# Patient Record
Sex: MALE | Race: WHITE | NOT HISPANIC OR LATINO | URBAN - METROPOLITAN AREA
[De-identification: names, ages, dates, MRNs, and addresses within clinical notes are randomized per-mention and may not be internally consistent; named-entity substitution may affect disease eponyms.]

---

## 2017-04-29 ENCOUNTER — EMERGENCY (EMERGENCY)
Facility: HOSPITAL | Age: 22
LOS: 1 days | End: 2017-04-29
Payer: COMMERCIAL

## 2017-04-29 PROCEDURE — 99284 EMERGENCY DEPT VISIT MOD MDM: CPT | Mod: 25

## 2017-04-29 PROCEDURE — 74176 CT ABD & PELVIS W/O CONTRAST: CPT | Mod: 26

## 2017-08-05 ENCOUNTER — EMERGENCY (EMERGENCY)
Facility: HOSPITAL | Age: 22
LOS: 1 days | End: 2017-08-05
Payer: COMMERCIAL

## 2017-08-05 PROCEDURE — 70450 CT HEAD/BRAIN W/O DYE: CPT | Mod: 26

## 2017-08-05 PROCEDURE — 99285 EMERGENCY DEPT VISIT HI MDM: CPT | Mod: 25

## 2017-12-01 ENCOUNTER — TRANSCRIPTION ENCOUNTER (OUTPATIENT)
Age: 22
End: 2017-12-01

## 2017-12-01 PROBLEM — Z00.00 ENCOUNTER FOR PREVENTIVE HEALTH EXAMINATION: Status: ACTIVE | Noted: 2017-12-01

## 2017-12-04 ENCOUNTER — EMERGENCY (EMERGENCY)
Facility: HOSPITAL | Age: 22
LOS: 1 days | End: 2017-12-04
Payer: COMMERCIAL

## 2017-12-04 PROCEDURE — 99285 EMERGENCY DEPT VISIT HI MDM: CPT

## 2017-12-04 PROCEDURE — 71010: CPT | Mod: 26

## 2017-12-12 ENCOUNTER — NON-APPOINTMENT (OUTPATIENT)
Age: 22
End: 2017-12-12

## 2017-12-12 ENCOUNTER — APPOINTMENT (OUTPATIENT)
Dept: CARDIOLOGY | Facility: CLINIC | Age: 22
End: 2017-12-12
Payer: COMMERCIAL

## 2017-12-12 VITALS
SYSTOLIC BLOOD PRESSURE: 110 MMHG | HEART RATE: 82 BPM | WEIGHT: 160 LBS | BODY MASS INDEX: 22.4 KG/M2 | HEIGHT: 71 IN | DIASTOLIC BLOOD PRESSURE: 74 MMHG

## 2017-12-12 DIAGNOSIS — R42 DIZZINESS AND GIDDINESS: ICD-10-CM

## 2017-12-12 PROCEDURE — 93000 ELECTROCARDIOGRAM COMPLETE: CPT

## 2017-12-12 PROCEDURE — 99244 OFF/OP CNSLTJ NEW/EST MOD 40: CPT

## 2017-12-15 ENCOUNTER — TRANSCRIPTION ENCOUNTER (OUTPATIENT)
Age: 22
End: 2017-12-15

## 2017-12-21 ENCOUNTER — APPOINTMENT (OUTPATIENT)
Dept: CARDIOLOGY | Facility: CLINIC | Age: 22
End: 2017-12-21
Payer: COMMERCIAL

## 2017-12-21 PROCEDURE — 93306 TTE W/DOPPLER COMPLETE: CPT

## 2017-12-28 ENCOUNTER — APPOINTMENT (OUTPATIENT)
Dept: CARDIOLOGY | Facility: CLINIC | Age: 22
End: 2017-12-28
Payer: COMMERCIAL

## 2017-12-28 PROCEDURE — 93351 STRESS TTE COMPLETE: CPT

## 2017-12-31 ENCOUNTER — TRANSCRIPTION ENCOUNTER (OUTPATIENT)
Age: 22
End: 2017-12-31

## 2018-01-02 ENCOUNTER — TRANSCRIPTION ENCOUNTER (OUTPATIENT)
Age: 23
End: 2018-01-02

## 2018-01-09 ENCOUNTER — APPOINTMENT (OUTPATIENT)
Dept: CARDIOLOGY | Facility: CLINIC | Age: 23
End: 2018-01-09

## 2018-07-26 ENCOUNTER — TRANSCRIPTION ENCOUNTER (OUTPATIENT)
Age: 23
End: 2018-07-26

## 2018-07-27 ENCOUNTER — APPOINTMENT (OUTPATIENT)
Dept: CARDIOLOGY | Facility: CLINIC | Age: 23
End: 2018-07-27
Payer: COMMERCIAL

## 2018-07-27 VITALS
HEART RATE: 60 BPM | DIASTOLIC BLOOD PRESSURE: 70 MMHG | WEIGHT: 160 LBS | BODY MASS INDEX: 22.4 KG/M2 | SYSTOLIC BLOOD PRESSURE: 120 MMHG | HEIGHT: 71 IN

## 2018-07-27 DIAGNOSIS — R00.2 PALPITATIONS: ICD-10-CM

## 2018-07-27 PROCEDURE — 99214 OFFICE O/P EST MOD 30 MIN: CPT

## 2018-07-31 PROCEDURE — 93224 XTRNL ECG REC UP TO 48 HRS: CPT

## 2018-08-02 ENCOUNTER — RESULT REVIEW (OUTPATIENT)
Age: 23
End: 2018-08-02

## 2018-08-03 ENCOUNTER — APPOINTMENT (OUTPATIENT)
Dept: CARDIOLOGY | Facility: CLINIC | Age: 23
End: 2018-08-03
Payer: COMMERCIAL

## 2018-08-03 PROCEDURE — 93308 TTE F-UP OR LMTD: CPT

## 2018-08-09 ENCOUNTER — APPOINTMENT (OUTPATIENT)
Dept: CARDIOLOGY | Facility: CLINIC | Age: 23
End: 2018-08-09
Payer: COMMERCIAL

## 2018-08-09 PROCEDURE — 93351 STRESS TTE COMPLETE: CPT

## 2018-08-15 ENCOUNTER — APPOINTMENT (OUTPATIENT)
Dept: CARDIOLOGY | Facility: CLINIC | Age: 23
End: 2018-08-15
Payer: COMMERCIAL

## 2018-08-15 VITALS
HEIGHT: 71 IN | OXYGEN SATURATION: 100 % | SYSTOLIC BLOOD PRESSURE: 131 MMHG | DIASTOLIC BLOOD PRESSURE: 89 MMHG | WEIGHT: 160 LBS | BODY MASS INDEX: 22.4 KG/M2 | HEART RATE: 63 BPM

## 2018-08-15 DIAGNOSIS — R94.31 ABNORMAL ELECTROCARDIOGRAM [ECG] [EKG]: ICD-10-CM

## 2018-08-15 DIAGNOSIS — R07.9 CHEST PAIN, UNSPECIFIED: ICD-10-CM

## 2018-08-15 PROCEDURE — 99214 OFFICE O/P EST MOD 30 MIN: CPT

## 2019-01-06 ENCOUNTER — EMERGENCY (EMERGENCY)
Facility: HOSPITAL | Age: 24
LOS: 1 days | End: 2019-01-06
Payer: COMMERCIAL

## 2019-01-06 ENCOUNTER — INPATIENT (INPATIENT)
Facility: HOSPITAL | Age: 24
LOS: 0 days | Discharge: HOME | DRG: 566 | End: 2019-01-07
Attending: PLASTIC SURGERY | Admitting: PLASTIC SURGERY
Payer: SELF-PAY

## 2019-01-06 ENCOUNTER — TRANSCRIPTION ENCOUNTER (OUTPATIENT)
Age: 24
End: 2019-01-06

## 2019-01-06 VITALS
OXYGEN SATURATION: 99 % | WEIGHT: 160.06 LBS | TEMPERATURE: 98 F | HEIGHT: 71 IN | HEART RATE: 90 BPM | DIASTOLIC BLOOD PRESSURE: 84 MMHG | SYSTOLIC BLOOD PRESSURE: 145 MMHG | RESPIRATION RATE: 18 BRPM

## 2019-01-06 LAB
APTT BLD: 33.8 SEC — SIGNIFICANT CHANGE UP (ref 27.5–36.3)
INR BLD: 1.09 RATIO — SIGNIFICANT CHANGE UP (ref 0.88–1.16)
PROTHROM AB SERPL-ACNC: 12.6 SEC — SIGNIFICANT CHANGE UP (ref 10–12.9)

## 2019-01-06 PROCEDURE — 73140 X-RAY EXAM OF FINGER(S): CPT | Mod: 26,RT

## 2019-01-06 PROCEDURE — 99283 EMERGENCY DEPT VISIT LOW MDM: CPT

## 2019-01-06 NOTE — ED PROVIDER NOTE - PHYSICAL EXAMINATION
Const: Awake, alert and oriented. In no acute distress. Well appearing.  HEENT: NC/AT. Moist mucous membranes.  Eyes: Conjunctiva pink, sclera white bilaterally EOMI.  Neck:. Soft and supple. Full ROM without pain.  Cardiac: Regular rate and regular rhythm. +S1/S2. No murmurs. Peripheral pulses 2+ and symmetric. No LE edema.  Resp: Speaking in full sentences. No evidence of respiratory distress. No wheezes, rales or rhonchi.  Abd: Soft, non-tender, non-distended. Normal bowel sounds in all 4 quadrants. No guarding or rebound.  Back: Spine midline and non-tender. No CVAT.  MSK: Decreased ROM in right 5th digit, Extension of right 5th digit absent, slight flexion noted, capillary refill <2, neurovascularly intact, radial pulse 2+, tendon visualized laceration   Skin: Deep circumferential laceration at the lateral right 5th PIP, no signs of necrosis or lack of blood supply  Lymph: No cervical lymphadenopathy.  Neuro: Awake, alert & oriented x 3. Moves all extremities symmetrically.

## 2019-01-06 NOTE — ED ADULT TRIAGE NOTE - CHIEF COMPLAINT QUOTE
I cut my rt 5th digit I was seen at [place] I didn't want a pa to suture it.    I was told to go to [facility] to follow up with hand surgeon dr elise is aware. he told me to come for sx in am

## 2019-01-06 NOTE — ED PROVIDER NOTE - CARE PLAN
Principal Discharge DX:	Laceration of finger Principal Discharge DX:	Laceration of finger of left hand with tendon involvement
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
## 2019-01-07 VITALS
RESPIRATION RATE: 12 BRPM | DIASTOLIC BLOOD PRESSURE: 81 MMHG | HEART RATE: 84 BPM | SYSTOLIC BLOOD PRESSURE: 118 MMHG | OXYGEN SATURATION: 100 %

## 2019-01-07 DIAGNOSIS — S61.219A LACERATION WITHOUT FOREIGN BODY OF UNSPECIFIED FINGER WITHOUT DAMAGE TO NAIL, INITIAL ENCOUNTER: ICD-10-CM

## 2019-01-07 LAB
ABO RH CONFIRMATION: SIGNIFICANT CHANGE UP
ALBUMIN SERPL ELPH-MCNC: 4.7 G/DL — SIGNIFICANT CHANGE UP (ref 3.3–5.2)
ALP SERPL-CCNC: 65 U/L — SIGNIFICANT CHANGE UP (ref 40–120)
ALT FLD-CCNC: 16 U/L — SIGNIFICANT CHANGE UP
ANION GAP SERPL CALC-SCNC: 15 MMOL/L — SIGNIFICANT CHANGE UP (ref 5–17)
AST SERPL-CCNC: 18 U/L — SIGNIFICANT CHANGE UP
BASOPHILS # BLD AUTO: 0 K/UL — SIGNIFICANT CHANGE UP (ref 0–0.2)
BASOPHILS NFR BLD AUTO: 0.2 % — SIGNIFICANT CHANGE UP (ref 0–2)
BILIRUB SERPL-MCNC: 0.7 MG/DL — SIGNIFICANT CHANGE UP (ref 0.4–2)
BLD GP AB SCN SERPL QL: SIGNIFICANT CHANGE UP
BUN SERPL-MCNC: 12 MG/DL — SIGNIFICANT CHANGE UP (ref 8–20)
CALCIUM SERPL-MCNC: 9.2 MG/DL — SIGNIFICANT CHANGE UP (ref 8.6–10.2)
CHLORIDE SERPL-SCNC: 101 MMOL/L — SIGNIFICANT CHANGE UP (ref 98–107)
CO2 SERPL-SCNC: 26 MMOL/L — SIGNIFICANT CHANGE UP (ref 22–29)
CREAT SERPL-MCNC: 0.92 MG/DL — SIGNIFICANT CHANGE UP (ref 0.5–1.3)
EOSINOPHIL # BLD AUTO: 0 K/UL — SIGNIFICANT CHANGE UP (ref 0–0.5)
EOSINOPHIL NFR BLD AUTO: 0.1 % — SIGNIFICANT CHANGE UP (ref 0–5)
GLUCOSE SERPL-MCNC: 105 MG/DL — SIGNIFICANT CHANGE UP (ref 70–115)
HCT VFR BLD CALC: 45.2 % — SIGNIFICANT CHANGE UP (ref 42–52)
HGB BLD-MCNC: 16.5 G/DL — SIGNIFICANT CHANGE UP (ref 14–18)
LYMPHOCYTES # BLD AUTO: 1.4 K/UL — SIGNIFICANT CHANGE UP (ref 1–4.8)
LYMPHOCYTES # BLD AUTO: 11.8 % — LOW (ref 20–55)
MCHC RBC-ENTMCNC: 31.4 PG — HIGH (ref 27–31)
MCHC RBC-ENTMCNC: 36.5 G/DL — HIGH (ref 32–36)
MCV RBC AUTO: 85.9 FL — SIGNIFICANT CHANGE UP (ref 80–94)
MONOCYTES # BLD AUTO: 0.6 K/UL — SIGNIFICANT CHANGE UP (ref 0–0.8)
MONOCYTES NFR BLD AUTO: 4.9 % — SIGNIFICANT CHANGE UP (ref 3–10)
NEUTROPHILS # BLD AUTO: 9.9 K/UL — HIGH (ref 1.8–8)
NEUTROPHILS NFR BLD AUTO: 82.7 % — HIGH (ref 37–73)
PLATELET # BLD AUTO: 231 K/UL — SIGNIFICANT CHANGE UP (ref 150–400)
POTASSIUM SERPL-MCNC: 4 MMOL/L — SIGNIFICANT CHANGE UP (ref 3.5–5.3)
POTASSIUM SERPL-SCNC: 4 MMOL/L — SIGNIFICANT CHANGE UP (ref 3.5–5.3)
PROT SERPL-MCNC: 7.1 G/DL — SIGNIFICANT CHANGE UP (ref 6.6–8.7)
RBC # BLD: 5.26 M/UL — SIGNIFICANT CHANGE UP (ref 4.6–6.2)
RBC # FLD: 12 % — SIGNIFICANT CHANGE UP (ref 11–15.6)
SODIUM SERPL-SCNC: 142 MMOL/L — SIGNIFICANT CHANGE UP (ref 135–145)
TYPE + AB SCN PNL BLD: SIGNIFICANT CHANGE UP
WBC # BLD: 11.9 K/UL — HIGH (ref 4.8–10.8)
WBC # FLD AUTO: 11.9 K/UL — HIGH (ref 4.8–10.8)

## 2019-01-07 PROCEDURE — 80053 COMPREHEN METABOLIC PANEL: CPT

## 2019-01-07 PROCEDURE — 93010 ELECTROCARDIOGRAM REPORT: CPT

## 2019-01-07 PROCEDURE — 26540 REPAIR HAND JOINT: CPT | Mod: AS

## 2019-01-07 PROCEDURE — 73130 X-RAY EXAM OF HAND: CPT

## 2019-01-07 PROCEDURE — C1713: CPT

## 2019-01-07 PROCEDURE — 86901 BLOOD TYPING SEROLOGIC RH(D): CPT

## 2019-01-07 PROCEDURE — 85027 COMPLETE CBC AUTOMATED: CPT

## 2019-01-07 PROCEDURE — 36415 COLL VENOUS BLD VENIPUNCTURE: CPT

## 2019-01-07 PROCEDURE — 73130 X-RAY EXAM OF HAND: CPT | Mod: 26,RT

## 2019-01-07 PROCEDURE — 73140 X-RAY EXAM OF FINGER(S): CPT | Mod: 26,RT

## 2019-01-07 PROCEDURE — 86900 BLOOD TYPING SEROLOGIC ABO: CPT

## 2019-01-07 PROCEDURE — 73140 X-RAY EXAM OF FINGER(S): CPT

## 2019-01-07 PROCEDURE — 71046 X-RAY EXAM CHEST 2 VIEWS: CPT | Mod: 26

## 2019-01-07 PROCEDURE — 99285 EMERGENCY DEPT VISIT HI MDM: CPT | Mod: 25

## 2019-01-07 PROCEDURE — 93005 ELECTROCARDIOGRAM TRACING: CPT

## 2019-01-07 PROCEDURE — 12001 RPR S/N/AX/GEN/TRNK 2.5CM/<: CPT | Mod: F9

## 2019-01-07 PROCEDURE — 85610 PROTHROMBIN TIME: CPT

## 2019-01-07 PROCEDURE — 85730 THROMBOPLASTIN TIME PARTIAL: CPT

## 2019-01-07 PROCEDURE — 71046 X-RAY EXAM CHEST 2 VIEWS: CPT

## 2019-01-07 PROCEDURE — 86850 RBC ANTIBODY SCREEN: CPT

## 2019-01-07 RX ORDER — ACETAMINOPHEN 500 MG
975 TABLET ORAL EVERY 8 HOURS
Qty: 0 | Refills: 0 | Status: DISCONTINUED | OUTPATIENT
Start: 2019-01-07 | End: 2019-01-07

## 2019-01-07 RX ORDER — OXYCODONE HYDROCHLORIDE 5 MG/1
10 TABLET ORAL
Qty: 0 | Refills: 0 | Status: DISCONTINUED | OUTPATIENT
Start: 2019-01-07 | End: 2019-01-07

## 2019-01-07 RX ORDER — ACETAMINOPHEN 500 MG
1000 TABLET ORAL ONCE
Qty: 0 | Refills: 0 | Status: DISCONTINUED | OUTPATIENT
Start: 2019-01-07 | End: 2019-01-07

## 2019-01-07 RX ORDER — OXYCODONE HYDROCHLORIDE 5 MG/1
5 TABLET ORAL
Qty: 0 | Refills: 0 | Status: DISCONTINUED | OUTPATIENT
Start: 2019-01-07 | End: 2019-01-07

## 2019-01-07 RX ORDER — SODIUM CHLORIDE 9 MG/ML
1000 INJECTION, SOLUTION INTRAVENOUS
Qty: 0 | Refills: 0 | Status: DISCONTINUED | OUTPATIENT
Start: 2019-01-07 | End: 2019-01-07

## 2019-01-07 RX ORDER — MORPHINE SULFATE 50 MG/1
4 CAPSULE, EXTENDED RELEASE ORAL EVERY 6 HOURS
Qty: 0 | Refills: 0 | Status: DISCONTINUED | OUTPATIENT
Start: 2019-01-07 | End: 2019-01-07

## 2019-01-07 RX ORDER — OXYCODONE HYDROCHLORIDE 5 MG/1
1 TABLET ORAL
Qty: 18 | Refills: 0 | OUTPATIENT
Start: 2019-01-07 | End: 2019-01-09

## 2019-01-07 RX ORDER — HYDROMORPHONE HYDROCHLORIDE 2 MG/ML
0.5 INJECTION INTRAMUSCULAR; INTRAVENOUS; SUBCUTANEOUS
Qty: 0 | Refills: 0 | Status: DISCONTINUED | OUTPATIENT
Start: 2019-01-07 | End: 2019-01-07

## 2019-01-07 RX ORDER — CEPHALEXIN 500 MG
1 CAPSULE ORAL
Qty: 12 | Refills: 0 | OUTPATIENT
Start: 2019-01-07 | End: 2019-01-09

## 2019-01-07 RX ORDER — CEFAZOLIN SODIUM 1 G
2000 VIAL (EA) INJECTION EVERY 8 HOURS
Qty: 0 | Refills: 0 | Status: DISCONTINUED | OUTPATIENT
Start: 2019-01-07 | End: 2019-01-07

## 2019-01-07 RX ORDER — OXYCODONE HYDROCHLORIDE 5 MG/1
10 TABLET ORAL EVERY 4 HOURS
Qty: 0 | Refills: 0 | Status: DISCONTINUED | OUTPATIENT
Start: 2019-01-07 | End: 2019-01-07

## 2019-01-07 RX ORDER — CEPHALEXIN 500 MG
1 CAPSULE ORAL
Qty: 6 | Refills: 0 | OUTPATIENT
Start: 2019-01-07 | End: 2019-01-09

## 2019-01-07 RX ORDER — CEPHALEXIN 500 MG
1 CAPSULE ORAL
Qty: 40 | Refills: 0 | OUTPATIENT
Start: 2019-01-07 | End: 2019-01-16

## 2019-01-07 RX ORDER — OXYCODONE HYDROCHLORIDE 5 MG/1
1 TABLET ORAL
Qty: 30 | Refills: 0 | OUTPATIENT
Start: 2019-01-07 | End: 2019-01-11

## 2019-01-07 RX ORDER — ONDANSETRON 8 MG/1
4 TABLET, FILM COATED ORAL ONCE
Qty: 0 | Refills: 0 | Status: DISCONTINUED | OUTPATIENT
Start: 2019-01-07 | End: 2019-01-07

## 2019-01-07 RX ORDER — OXYCODONE HYDROCHLORIDE 5 MG/1
5 TABLET ORAL EVERY 4 HOURS
Qty: 0 | Refills: 0 | Status: DISCONTINUED | OUTPATIENT
Start: 2019-01-07 | End: 2019-01-07

## 2019-01-07 RX ADMIN — Medication 975 MILLIGRAM(S): at 06:15

## 2019-01-07 RX ADMIN — Medication 100 MILLIGRAM(S): at 05:24

## 2019-01-07 RX ADMIN — SODIUM CHLORIDE 80 MILLILITER(S): 9 INJECTION, SOLUTION INTRAVENOUS at 02:15

## 2019-01-07 RX ADMIN — Medication 975 MILLIGRAM(S): at 05:25

## 2019-01-07 NOTE — ASU DISCHARGE PLAN (ADULT/PEDIATRIC). - NOTIFY
Swelling that continues/Pain not relieved by Medications/Fever greater than 101/Numbness, tingling/Bleeding that does not stop/Numbness, color, or temperature change to extremity

## 2019-01-07 NOTE — ED ADULT NURSE NOTE - CHPI ED NUR SYMPTOMS NEG
no bruising/no abrasion/no tingling/no stiffness/no back pain/no weakness/no difficulty bearing weight/no fever

## 2019-01-07 NOTE — ED ADULT NURSE NOTE - CHIEF COMPLAINT QUOTE
I cut my rt 5th digit I was seen at [place] I didn't want a pa to suture it.    I was told to go to [facility] to follow up with hand surgeon dr elise is aware. he told me to come for sx in am
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
## 2019-01-13 ENCOUNTER — TRANSCRIPTION ENCOUNTER (OUTPATIENT)
Age: 24
End: 2019-01-13

## 2019-03-01 ENCOUNTER — TRANSCRIPTION ENCOUNTER (OUTPATIENT)
Age: 24
End: 2019-03-01

## 2019-09-03 NOTE — ED ADULT NURSE REASSESSMENT NOTE - NS ED NURSE REASSESS COMMENT FT1
patient in no distress family at bedside, awaiting call back from floor, VSS, offered pain medication patient declined, no other complaints offered Yes

## 2022-09-20 ENCOUNTER — NON-APPOINTMENT (OUTPATIENT)
Age: 27
End: 2022-09-20

## 2022-09-20 ENCOUNTER — APPOINTMENT (OUTPATIENT)
Dept: OPHTHALMOLOGY | Facility: CLINIC | Age: 27
End: 2022-09-20

## 2022-09-20 PROCEDURE — 92004 COMPRE OPH EXAM NEW PT 1/>: CPT

## 2022-09-28 ENCOUNTER — APPOINTMENT (OUTPATIENT)
Dept: OPHTHALMOLOGY | Facility: CLINIC | Age: 27
End: 2022-09-28

## 2022-09-28 ENCOUNTER — NON-APPOINTMENT (OUTPATIENT)
Age: 27
End: 2022-09-28

## 2022-09-28 PROCEDURE — 92012 INTRM OPH EXAM EST PATIENT: CPT

## 2023-01-25 ENCOUNTER — NON-APPOINTMENT (OUTPATIENT)
Age: 28
End: 2023-01-25

## 2023-01-25 ENCOUNTER — APPOINTMENT (OUTPATIENT)
Dept: OPHTHALMOLOGY | Facility: CLINIC | Age: 28
End: 2023-01-25
Payer: MEDICAID

## 2023-01-25 PROCEDURE — 92014 COMPRE OPH EXAM EST PT 1/>: CPT

## 2023-02-08 ENCOUNTER — NON-APPOINTMENT (OUTPATIENT)
Age: 28
End: 2023-02-08

## 2023-02-08 ENCOUNTER — APPOINTMENT (OUTPATIENT)
Dept: OPHTHALMOLOGY | Facility: CLINIC | Age: 28
End: 2023-02-08
Payer: MEDICAID

## 2023-02-08 PROCEDURE — 99213 OFFICE O/P EST LOW 20 MIN: CPT

## 2023-03-16 ENCOUNTER — APPOINTMENT (OUTPATIENT)
Dept: OPHTHALMOLOGY | Facility: CLINIC | Age: 28
End: 2023-03-16

## 2024-04-29 ENCOUNTER — APPOINTMENT (OUTPATIENT)
Dept: ORTHOPEDIC SURGERY | Facility: CLINIC | Age: 29
End: 2024-04-29
Payer: COMMERCIAL

## 2024-04-29 DIAGNOSIS — S43.51XA SPRAIN OF RIGHT ACROMIOCLAVICULAR JOINT, INITIAL ENCOUNTER: ICD-10-CM

## 2024-04-29 PROCEDURE — 73030 X-RAY EXAM OF SHOULDER: CPT | Mod: RT

## 2024-04-29 PROCEDURE — 99204 OFFICE O/P NEW MOD 45 MIN: CPT

## 2024-04-29 NOTE — HISTORY OF PRESENT ILLNESS
[de-identified] : Date of evaluation 04/29/2024  Patient age in years is 28 Occupation is xray tech Body part causing symptoms is the right shoulder Symptoms began 04/15/2024, felt pain while bench pressing  Location of pain is superior Quality of pain is dull Pain score at rest is 2/10 Pain score during activity is 5/10 Radicular symptoms are not present Prior treatments include rest Patient's condition is not associated with workers compensation, no-fault or interscholastic athletics

## 2024-04-29 NOTE — IMAGING
[de-identified] : (Exam: Shoulder)   Laterality is right    Patient is in no acute distress, alert and oriented Sensation is grossly intact to light touch in the hand Motor function is 5/5 in the hand Capillary refill is less than 2 seconds in the fingers Lymphadenopathy is not present Peripheral edema is not present   Skin is intact Swelling is not present Atrophy is not present Scapular winging is not present Deformity of the AC joint is not present Deformity of the biceps is not present   Bicipital groove tenderness is present AC joint tenderness is present Scapulothoracic tenderness is not present   Active forward elevation is 160 Passive forward elevation is 175 External rotation at the side is 60 Internal rotation behind the back is to the level of T12   Forward elevation strength is 5-/5 External rotation strength at the side is 5/5 Internal rotation strength at the side is 5/5 Deltoid strength of anterior, posterior and lateral heads is 5/5   Redd test is abnormal OBriens test is abnormal Empty can test is abnormal Speeds test is abnormal Cross body adduction test is abnormal Belly press test is normal Apprehension and relocation is normal Sulcus sign is normal    [Right] : right shoulder [There are no fractures, subluxations or dislocations. No significant abnormalities are seen] : There are no fractures, subluxations or dislocations. No significant abnormalities are seen

## 2024-04-29 NOTE — DISCUSSION/SUMMARY
[de-identified] : History, clinical examination and imaging were most consistent with: -right shoulder grade 1 AC joint sprain, possible SLAP injury   The diagnosis was explained in detail. The potential non-surgical and surgical treatments were reviewed. The relative risks and benefits of each option were considered relative to the patients age, activity level, medical history, symptom severity and previously attempted treatments.   The patient was advised to consult with their primary medical provider prior to initiation of any new medications to reduce the risk of adverse effects specific to their long-term home medications and medical history. The risk of gastrointestinal irritation and kidney injury specific to long-term NSAID use was discussed.   -Patient will proceed with formal PT. -Meloxicam as needed for pain. -The added clinical utility of an MRI was discussed. The patient deferred further diagnostic testing at this time. -Follow up in 6 weeks. If symptoms persist consider MRI.    ([facility])   Problem Complexity -Moderate: acute, complicated injury   Risk -Moderate: prescription medication   -Patient has not been seen by another provider in my practice within the past 2 years who specializes in orthopedic surgery.

## 2024-06-10 ENCOUNTER — APPOINTMENT (OUTPATIENT)
Dept: ORTHOPEDIC SURGERY | Facility: CLINIC | Age: 29
End: 2024-06-10

## 2024-10-07 ENCOUNTER — APPOINTMENT (OUTPATIENT)
Dept: ORTHOPEDIC SURGERY | Facility: CLINIC | Age: 29
End: 2024-10-07
Payer: COMMERCIAL

## 2024-10-07 VITALS — WEIGHT: 170 LBS | BODY MASS INDEX: 23.8 KG/M2 | HEIGHT: 71 IN

## 2024-10-07 DIAGNOSIS — S63.509A UNSPECIFIED SPRAIN OF UNSPECIFIED WRIST, INITIAL ENCOUNTER: ICD-10-CM

## 2024-10-07 DIAGNOSIS — Z78.9 OTHER SPECIFIED HEALTH STATUS: ICD-10-CM

## 2024-10-07 PROCEDURE — 99204 OFFICE O/P NEW MOD 45 MIN: CPT

## 2024-10-07 PROCEDURE — 73130 X-RAY EXAM OF HAND: CPT | Mod: RT

## 2024-10-08 PROBLEM — S63.509A WRIST SPRAIN: Status: ACTIVE | Noted: 2024-10-07

## 2024-11-18 ENCOUNTER — APPOINTMENT (OUTPATIENT)
Dept: ORTHOPEDIC SURGERY | Facility: CLINIC | Age: 29
End: 2024-11-18

## 2025-01-29 ENCOUNTER — INPATIENT (INPATIENT)
Facility: HOSPITAL | Age: 30
LOS: 12 days | Discharge: HOME | DRG: 885 | End: 2025-02-11
Attending: PSYCHIATRY & NEUROLOGY | Admitting: PSYCHIATRY & NEUROLOGY
Payer: COMMERCIAL

## 2025-01-29 VITALS — RESPIRATION RATE: 18 BRPM | HEIGHT: 70 IN | OXYGEN SATURATION: 95 % | WEIGHT: 154.1 LBS | TEMPERATURE: 99 F

## 2025-01-29 DIAGNOSIS — F33.3 MAJOR DEPRESSIVE DISORDER, RECURRENT, SEVERE WITH PSYCHOTIC SYMPTOMS: ICD-10-CM

## 2025-01-29 PROCEDURE — 80061 LIPID PANEL: CPT

## 2025-01-29 PROCEDURE — 80053 COMPREHEN METABOLIC PANEL: CPT

## 2025-01-29 PROCEDURE — 86769 SARS-COV-2 COVID-19 ANTIBODY: CPT

## 2025-01-29 PROCEDURE — 84443 ASSAY THYROID STIM HORMONE: CPT

## 2025-01-29 PROCEDURE — 83036 HEMOGLOBIN GLYCOSYLATED A1C: CPT

## 2025-01-29 PROCEDURE — 85025 COMPLETE CBC W/AUTO DIFF WBC: CPT

## 2025-01-29 PROCEDURE — 99223 1ST HOSP IP/OBS HIGH 75: CPT

## 2025-01-29 PROCEDURE — 36415 COLL VENOUS BLD VENIPUNCTURE: CPT

## 2025-01-29 PROCEDURE — 99232 SBSQ HOSP IP/OBS MODERATE 35: CPT

## 2025-01-29 RX ORDER — IRON/FOLIC ACID/C/B6/B12/ZINC 150-1.25MG
1 TABLET ORAL DAILY
Refills: 0 | Status: DISCONTINUED | OUTPATIENT
Start: 2025-01-29 | End: 2025-02-11

## 2025-01-29 RX ORDER — ESCITALOPRAM 10 MG/1
5 TABLET, FILM COATED ORAL DAILY
Refills: 0 | Status: DISCONTINUED | OUTPATIENT
Start: 2025-01-30 | End: 2025-01-31

## 2025-01-29 RX ORDER — THIAMINE HCL 100 MG
100 TABLET ORAL DAILY
Refills: 0 | Status: DISCONTINUED | OUTPATIENT
Start: 2025-01-29 | End: 2025-02-11

## 2025-01-29 RX ORDER — MAGNESIUM, ALUMINUM HYDROXIDE 200-225/5
30 SUSPENSION, ORAL (FINAL DOSE FORM) ORAL EVERY 6 HOURS
Refills: 0 | Status: DISCONTINUED | OUTPATIENT
Start: 2025-01-29 | End: 2025-02-11

## 2025-01-29 RX ORDER — OSELTAMIVIR PHOSPHATE 75 MG/1
75 CAPSULE ORAL EVERY 24 HOURS
Refills: 0 | Status: COMPLETED | OUTPATIENT
Start: 2025-01-29 | End: 2025-02-07

## 2025-01-29 RX ORDER — MAGNESIUM HYDROXIDE 400 MG/5ML
30 SUSPENSION, ORAL (FINAL DOSE FORM) ORAL DAILY
Refills: 0 | Status: DISCONTINUED | OUTPATIENT
Start: 2025-01-29 | End: 2025-02-11

## 2025-01-29 RX ORDER — ACETAMINOPHEN 160 MG/5ML
650 SUSPENSION ORAL EVERY 6 HOURS
Refills: 0 | Status: DISCONTINUED | OUTPATIENT
Start: 2025-01-29 | End: 2025-02-11

## 2025-01-29 RX ORDER — ACETAMINOPHEN, DIPHENHYDRAMINE HCL, PHENYLEPHRINE HCL 325; 25; 5 MG/1; MG/1; MG/1
5 TABLET ORAL AT BEDTIME
Refills: 0 | Status: DISCONTINUED | OUTPATIENT
Start: 2025-01-29 | End: 2025-02-11

## 2025-01-29 RX ORDER — OLANZAPINE 10 MG/1
5 TABLET, FILM COATED ORAL AT BEDTIME
Refills: 0 | Status: DISCONTINUED | OUTPATIENT
Start: 2025-01-29 | End: 2025-01-31

## 2025-01-29 RX ADMIN — ACETAMINOPHEN, DIPHENHYDRAMINE HCL, PHENYLEPHRINE HCL 5 MILLIGRAM(S): 325; 25; 5 TABLET ORAL at 21:39

## 2025-01-29 RX ADMIN — OLANZAPINE 5 MILLIGRAM(S): 10 TABLET, FILM COATED ORAL at 20:52

## 2025-01-29 RX ADMIN — OSELTAMIVIR PHOSPHATE 75 MILLIGRAM(S): 75 CAPSULE ORAL at 20:52
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
## 2025-01-29 NOTE — H&P ADULT - SKIN
+ linear surgical wound to the umbilicus with 5 staples and linear surgical wound to the epigastric area with 4 staples./warm and dry/color normal/normal/no rashes/no ulcers/wound

## 2025-01-29 NOTE — BH PATIENT PROFILE - NSVRISKUNREALISTIC_PSY_ALL_CORE
Subjective:   Lauren Bowden is a 71 y.o. female here today for evaluation and management of:     Chronic respiratory failure with hypoxia (HCC)  Underlying copd  Continues on oxygen supplementation during the day and night 2L via nasal cannula.     Stage 3 chronic kidney disease (HCC)  Stable,   Repeat labs ordered  Encouraged to stay well hydrated 64oz daily  HTN controlled  Stopped smoking about 5 years ago  Encouraged to avoid NSAIDS and salty foods.          Current medicines (including changes today)  Current Outpatient Medications   Medication Sig Dispense Refill   • buPROPion SR (WELLBUTRIN-SR) 150 MG TABLET SR 12 HR sustained-release tablet TAKE 1 TABLET BY MOUTH  EVERY DAY 90 Tab 0   • furosemide (LASIX) 20 MG Tab TAKE 1 TABLET BY MOUTH TWO  TIMES DAILY 180 Tab 0   • cyclobenzaprine (FLEXERIL) 10 MG Tab TAKE 1 TABLET BY MOUTH 3  TIMES DAILY AS NEEDED FOR  MILD PAIN. 90 Tab 0   • rosuvastatin (CRESTOR) 10 MG Tab TAKE 1 TABLET BY MOUTH  EVERY EVENING 90 Tab 0   • felodipine (PLENDIL) 5 MG TABLET SR 24 HR TAKE 2 TABLETS BY MOUTH  EVERY  Tab 0   • raloxifene (EVISTA) 60 MG Tab TAKE 1 TABLET BY MOUTH  EVERY DAY 90 Tab 0   • ipratropium-albuterol (COMBIVENT RESPIMAT)  MCG/ACT Aero Soln Inhale 1 Puff by mouth 4 times a day. 3 Inhaler 1   • Misc. Devices Misc Portable oxygen concentrator: use daily. 1 Each 0   • clopidogrel (PLAVIX) 75 MG Tab Take 1 Tab by mouth every day. 90 Tab 1   • omeprazole (PRILOSEC) 20 MG delayed-release capsule Take 1 Cap by mouth every day. 90 Cap 1   • calcitRIOL (ROCALTROL) 0.25 MCG Cap TAKE 1 CAPSULE BY MOUTH  EVERY 48 HOURS 45 Cap 3   • albuterol (PROVENTIL) 2.5mg/3ml Nebu Soln solution for nebulization 3 mL by Nebulization route every four hours as needed for Shortness of Breath. 75 mL 11     No current facility-administered medications for this visit.      She  has a past medical history of Arthritis, Asthma, Cataract, CKD (chronic kidney disease) stage 4, GFR  "15-29 ml/min (HCC) (7/1/2013), COPD, Dental disorder, Heart burn, History of anemia, Hypercholesteremia, Hypertension, Indigestion, Ovarian neoplasm (8/10/2017), Pneumonia (? 2012), Pulmonary emphysema (HCC), Stroke (HCC) (2016), and Vitamin d deficiency (7/1/2013).    ROS  No chest pain, no shortness of breath, no abdominal pain       Objective:     /84   Pulse 74   Temp 36.3 °C (97.3 °F) (Temporal)   Resp 16   Ht 1.651 m (5' 5\")   Wt 83.5 kg (184 lb)   SpO2 91%  Body mass index is 30.62 kg/m².   Physical Exam:  Constitutional: Alert, no distress.  Skin: Warm, dry, good turgor, no rashes in visible areas.  Eye: Equal, round and reactive, conjunctiva clear, lids normal.  ENMT: Lips without lesions, good dentition, oropharynx clear.  Neck: Trachea midline, no masses, no thyromegaly. No cervical or supraclavicular lymphadenopathy  Respiratory: Unlabored respiratory effort, lungs clear to auscultation, no wheezes, no ronchi.  Cardiovascular: Normal S1, S2, no murmur, no edema.  Abdomen: Soft, non-tender, no masses, no hepatosplenomegaly.  Psych: Alert and oriented x3, normal affect and mood.        Assessment and Plan:   The following treatment plan was discussed    1. Stage 3 chronic kidney disease (HCC)  - Comp Metabolic Panel; Future  - Lipid Profile; Future  - VITAMIN D,25 HYDROXY; Future  Continue vit d supplement daily.   - PTH INTACT (PTH ONLY); Future  - URINALYSIS; Future  - CBC WITH DIFFERENTIAL; Future  - MICROALBUMIN CREAT RATIO URINE; Future  - REFERRAL TO NEPHROLOGY    2. Essential hypertension  Controlled.   - Comp Metabolic Panel; Future    3. Dyslipidemia  Continue rosuvastatin 10 mg  Recheck labs.   - Lipid Profile; Future    4. Breast cancer screening  - MA-SCREENING MAMMO BILAT W/CAD; Future    5. Colon cancer screening  - REFERRAL TO GI FOR COLONOSCOPY    6. Encounter for hepatitis C screening test for low risk patient  - HEP C VIRUS ANTIBODY; Future    7. Chronic respiratory failure " with hypoxia (HCC)  Stable. Continues oxygen supplementation 2L    8. Obesity (BMI 30-39.9)  - Patient identified as having weight management issue.  Appropriate orders and counseling given.      Followup: Return in about 6 months (around 9/4/2020) for CKD, wt, copd.          No

## 2025-01-29 NOTE — BH PATIENT PROFILE - HOME MEDICATIONS
oxyCODONE 5 mg oral tablet , 1 tab(s) orally every 4 hours x 5 days MDD:5 tabs  cephalexin 500 mg oral capsule , 1 cap(s) orally 4 times a day
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
## 2025-01-29 NOTE — BH INPATIENT PSYCHIATRY ASSESSMENT NOTE - NSBHCHARTREVIEWVS_PSY_A_CORE FT
Vital Signs Last 24 Hrs  T(C): 37 (01-29-25 @ 13:16), Max: 37 (01-29-25 @ 13:16)  T(F): 98.6 (01-29-25 @ 13:16), Max: 98.6 (01-29-25 @ 13:16)  HR: --  BP: --  BP(mean): --  RR: 18 (01-29-25 @ 13:16) (18 - 18)  SpO2: 95% (01-29-25 @ 13:16) (95% - 95%)    Orthostatic VS  01-29-25 @ 13:16  Lying BP: --/-- HR: --  Sitting BP: 118/90 HR: 111  Standing BP: 113/78 HR: 136  Site: --  Mode: --
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
## 2025-01-29 NOTE — BH PATIENT PROFILE - FUNCTIONAL ASSESSMENT - BASIC MOBILITY 6.
4-calculated by average/Not able to assess (calculate score using [facility] averaging method)

## 2025-01-29 NOTE — BH INPATIENT PSYCHIATRY ASSESSMENT NOTE - DESCRIPTION
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

## 2025-01-29 NOTE — BH INPATIENT PSYCHIATRY ASSESSMENT NOTE - NSBHMETABOLIC_PSY_ALL_CORE_FT
BMI: BMI (kg/m2): 22.1 (01-29-25 @ 13:16)  HbA1c:   Glucose:   BP: --Vital Signs Last 24 Hrs  T(C): 37 (01-29-25 @ 13:16), Max: 37 (01-29-25 @ 13:16)  T(F): 98.6 (01-29-25 @ 13:16), Max: 98.6 (01-29-25 @ 13:16)  HR: --  BP: --  BP(mean): --  RR: 18 (01-29-25 @ 13:16) (18 - 18)  SpO2: 95% (01-29-25 @ 13:16) (95% - 95%)    Orthostatic VS  01-29-25 @ 13:16  Lying BP: --/-- HR: --  Sitting BP: 118/90 HR: 111  Standing BP: 113/78 HR: 136  Site: --  Mode: --    Lipid Panel:
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
## 2025-01-29 NOTE — H&P ADULT - NEGATIVE PSYCHIATRIC SYMPTOMS
no insomnia/no memory loss/no paranoia/no mood swings/no agitation/no visual hallucinations/no auditory hallucinations
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
## 2025-01-29 NOTE — BH INPATIENT PSYCHIATRY ASSESSMENT NOTE - VIOLENCE RISK FACTORS:
Feeling of being under threat and being unable to control threat/Substance abuse/Affective dysregulation/Impulsivity/Lack of insight into violence risk/need for treatment/Firearm/weapon access

## 2025-01-29 NOTE — BH INPATIENT PSYCHIATRY ASSESSMENT NOTE - CURRENT MEDICATION
MEDICATIONS  (STANDING):  influenza   Vaccine 0.5 milliLiter(s) IntraMuscular once  melatonin 5 milliGRAM(s) Oral at bedtime  multivitamin/minerals 1 Tablet(s) Oral daily  thiamine 100 milliGRAM(s) Oral daily    MEDICATIONS  (PRN):  acetaminophen     Tablet .. 650 milliGRAM(s) Oral every 6 hours PRN Temp greater or equal to 38C (100.4F), Mild Pain (1 - 3), Moderate Pain (4 - 6)  aluminum hydroxide/magnesium hydroxide/simethicone Suspension 30 milliLiter(s) Oral every 6 hours PRN Dyspepsia  LORazepam     Tablet 1 milliGRAM(s) Oral every 6 hours PRN mild to moderate anxiety/agitation due to mood d/o  magnesium hydroxide Suspension 30 milliLiter(s) Oral daily PRN Constipation

## 2025-01-29 NOTE — BH INPATIENT PSYCHIATRY ASSESSMENT NOTE - NSBHSAALC_PSY_A_CORE FT
Patient reports to drinking socially in his late teens and early 20's. Patient reports his last drink on the day of his SA. Also reports drinking has increased as of late (3-4 times per week).

## 2025-01-30 LAB
A1C WITH ESTIMATED AVERAGE GLUCOSE RESULT: 4.9 % — SIGNIFICANT CHANGE UP (ref 4–5.6)
ALBUMIN SERPL ELPH-MCNC: 3.6 G/DL — SIGNIFICANT CHANGE UP (ref 3.3–5)
ALP SERPL-CCNC: 56 U/L — SIGNIFICANT CHANGE UP (ref 40–120)
ALT FLD-CCNC: 39 U/L — SIGNIFICANT CHANGE UP (ref 12–78)
ANION GAP SERPL CALC-SCNC: 5 MMOL/L — SIGNIFICANT CHANGE UP (ref 5–17)
AST SERPL-CCNC: 24 U/L — SIGNIFICANT CHANGE UP (ref 15–37)
BASOPHILS # BLD AUTO: 0.05 K/UL — SIGNIFICANT CHANGE UP (ref 0–0.2)
BASOPHILS NFR BLD AUTO: 1 % — SIGNIFICANT CHANGE UP (ref 0–2)
BILIRUB SERPL-MCNC: 0.5 MG/DL — SIGNIFICANT CHANGE UP (ref 0.2–1.2)
BUN SERPL-MCNC: 18 MG/DL — SIGNIFICANT CHANGE UP (ref 7–23)
CALCIUM SERPL-MCNC: 9.1 MG/DL — SIGNIFICANT CHANGE UP (ref 8.5–10.1)
CHLORIDE SERPL-SCNC: 108 MMOL/L — SIGNIFICANT CHANGE UP (ref 96–108)
CHOLEST SERPL-MCNC: 142 MG/DL — SIGNIFICANT CHANGE UP
CO2 SERPL-SCNC: 28 MMOL/L — SIGNIFICANT CHANGE UP (ref 22–31)
CREAT SERPL-MCNC: 1.06 MG/DL — SIGNIFICANT CHANGE UP (ref 0.5–1.3)
EGFR: 97 ML/MIN/1.73M2 — SIGNIFICANT CHANGE UP
EOSINOPHIL # BLD AUTO: 0.3 K/UL — SIGNIFICANT CHANGE UP (ref 0–0.5)
EOSINOPHIL NFR BLD AUTO: 5.7 % — SIGNIFICANT CHANGE UP (ref 0–6)
ESTIMATED AVERAGE GLUCOSE: 94 MG/DL — SIGNIFICANT CHANGE UP (ref 68–114)
GLUCOSE SERPL-MCNC: 95 MG/DL — SIGNIFICANT CHANGE UP (ref 70–99)
HCT VFR BLD CALC: 43.2 % — SIGNIFICANT CHANGE UP (ref 39–50)
HDLC SERPL-MCNC: 38 MG/DL — LOW
HGB BLD-MCNC: 15.4 G/DL — SIGNIFICANT CHANGE UP (ref 13–17)
IMM GRANULOCYTES NFR BLD AUTO: 0.4 % — SIGNIFICANT CHANGE UP (ref 0–0.9)
LIPID PNL WITH DIRECT LDL SERPL: 79 MG/DL — SIGNIFICANT CHANGE UP
LYMPHOCYTES # BLD AUTO: 1.61 K/UL — SIGNIFICANT CHANGE UP (ref 1–3.3)
LYMPHOCYTES # BLD AUTO: 30.7 % — SIGNIFICANT CHANGE UP (ref 13–44)
MCHC RBC-ENTMCNC: 31.3 PG — SIGNIFICANT CHANGE UP (ref 27–34)
MCHC RBC-ENTMCNC: 35.6 G/DL — SIGNIFICANT CHANGE UP (ref 32–36)
MCV RBC AUTO: 87.8 FL — SIGNIFICANT CHANGE UP (ref 80–100)
MONOCYTES # BLD AUTO: 0.48 K/UL — SIGNIFICANT CHANGE UP (ref 0–0.9)
MONOCYTES NFR BLD AUTO: 9.2 % — SIGNIFICANT CHANGE UP (ref 2–14)
NEUTROPHILS # BLD AUTO: 2.78 K/UL — SIGNIFICANT CHANGE UP (ref 1.8–7.4)
NEUTROPHILS NFR BLD AUTO: 53 % — SIGNIFICANT CHANGE UP (ref 43–77)
NON HDL CHOLESTEROL: 104 MG/DL — SIGNIFICANT CHANGE UP
PLATELET # BLD AUTO: 200 K/UL — SIGNIFICANT CHANGE UP (ref 150–400)
POTASSIUM SERPL-MCNC: 3.7 MMOL/L — SIGNIFICANT CHANGE UP (ref 3.5–5.3)
POTASSIUM SERPL-SCNC: 3.7 MMOL/L — SIGNIFICANT CHANGE UP (ref 3.5–5.3)
PROT SERPL-MCNC: 6.5 GM/DL — SIGNIFICANT CHANGE UP (ref 6–8.3)
RBC # BLD: 4.92 M/UL — SIGNIFICANT CHANGE UP (ref 4.2–5.8)
RBC # FLD: 12.1 % — SIGNIFICANT CHANGE UP (ref 10.3–14.5)
SARS-COV-2 IGG+IGM SERPL QL IA: >250 U/ML — HIGH
SARS-COV-2 IGG+IGM SERPL QL IA: POSITIVE
SODIUM SERPL-SCNC: 141 MMOL/L — SIGNIFICANT CHANGE UP (ref 135–145)
TRIGL SERPL-MCNC: 141 MG/DL — SIGNIFICANT CHANGE UP
TSH SERPL-MCNC: 1.28 UU/ML — SIGNIFICANT CHANGE UP (ref 0.34–4.82)
WBC # BLD: 5.24 K/UL — SIGNIFICANT CHANGE UP (ref 3.8–10.5)
WBC # FLD AUTO: 5.24 K/UL — SIGNIFICANT CHANGE UP (ref 3.8–10.5)

## 2025-01-30 PROCEDURE — 99232 SBSQ HOSP IP/OBS MODERATE 35: CPT

## 2025-01-30 RX ORDER — IBUPROFEN 600 MG/1
600 TABLET, FILM COATED ORAL EVERY 6 HOURS
Refills: 0 | Status: DISCONTINUED | OUTPATIENT
Start: 2025-01-30 | End: 2025-02-11

## 2025-01-30 RX ORDER — POTASSIUM CHLORIDE 750 MG/1
40 TABLET, EXTENDED RELEASE ORAL ONCE
Refills: 0 | Status: COMPLETED | OUTPATIENT
Start: 2025-01-30 | End: 2025-01-30

## 2025-01-30 RX ADMIN — OLANZAPINE 5 MILLIGRAM(S): 10 TABLET, FILM COATED ORAL at 21:44

## 2025-01-30 RX ADMIN — Medication 1 TABLET(S): at 09:28

## 2025-01-30 RX ADMIN — OSELTAMIVIR PHOSPHATE 75 MILLIGRAM(S): 75 CAPSULE ORAL at 21:43

## 2025-01-30 RX ADMIN — ACETAMINOPHEN, DIPHENHYDRAMINE HCL, PHENYLEPHRINE HCL 5 MILLIGRAM(S): 325; 25; 5 TABLET ORAL at 21:43

## 2025-01-30 RX ADMIN — Medication 100 MILLIGRAM(S): at 09:27

## 2025-01-30 RX ADMIN — POTASSIUM CHLORIDE 40 MILLIEQUIVALENT(S): 750 TABLET, EXTENDED RELEASE ORAL at 21:43

## 2025-01-30 RX ADMIN — ESCITALOPRAM 5 MILLIGRAM(S): 10 TABLET, FILM COATED ORAL at 09:27

## 2025-01-30 NOTE — BH SOCIAL WORK INITIAL PSYCHOSOCIAL EVALUATION - NSBHEMPLOYEDYN_PSY_ALL_CORE
Patient was employed until recently when he resigned from his job related to his belief that he had been taped viewing controversial material on the internet and was about to create a scandal./No
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
## 2025-01-30 NOTE — BH SOCIAL WORK INITIAL PSYCHOSOCIAL EVALUATION - NSBHSUBSTANCEHX_PSY_ALL_CORE
Patient states he is a social drinker, drinks 2-3 drinks, 2-3 times per week. He denies any substance misuse.

## 2025-01-30 NOTE — BH SOCIAL WORK INITIAL PSYCHOSOCIAL EVALUATION - NSBHCHILDEVENTS_PSY_ALL_CORE
Patient states he comes from a stable home and his parents are concerned and supportive./Other (specify)

## 2025-01-30 NOTE — BH INPATIENT PSYCHIATRY PROGRESS NOTE - NSBHFUPINTERVALCCFT_PSY_A_CORE
"  I'm  kind of a perfectionist.  I worry a lot about how things look how things are done."      Hospitalist H and P notes appreciated 1/30/25.

## 2025-01-30 NOTE — BH SOCIAL WORK INITIAL PSYCHOSOCIAL EVALUATION - NSBHMILITARYHX_PSY_ALL_CORE
Tried to do orthostatic blood pressure but pt couldn't stand up.  Pt c/o dizziness when she sat up.     None

## 2025-01-31 PROCEDURE — 99232 SBSQ HOSP IP/OBS MODERATE 35: CPT

## 2025-01-31 RX ORDER — ESCITALOPRAM 10 MG/1
10 TABLET, FILM COATED ORAL DAILY
Refills: 0 | Status: DISCONTINUED | OUTPATIENT
Start: 2025-02-01 | End: 2025-02-05

## 2025-01-31 RX ORDER — OLANZAPINE 10 MG/1
7.5 TABLET, FILM COATED ORAL AT BEDTIME
Refills: 0 | Status: DISCONTINUED | OUTPATIENT
Start: 2025-01-31 | End: 2025-02-05

## 2025-01-31 RX ADMIN — OLANZAPINE 7.5 MILLIGRAM(S): 10 TABLET, FILM COATED ORAL at 20:26

## 2025-01-31 RX ADMIN — Medication 100 MILLIGRAM(S): at 10:28

## 2025-01-31 RX ADMIN — ACETAMINOPHEN, DIPHENHYDRAMINE HCL, PHENYLEPHRINE HCL 5 MILLIGRAM(S): 325; 25; 5 TABLET ORAL at 20:26

## 2025-01-31 RX ADMIN — Medication 1 TABLET(S): at 10:28

## 2025-01-31 RX ADMIN — OSELTAMIVIR PHOSPHATE 75 MILLIGRAM(S): 75 CAPSULE ORAL at 20:26

## 2025-01-31 RX ADMIN — ESCITALOPRAM 5 MILLIGRAM(S): 10 TABLET, FILM COATED ORAL at 10:28

## 2025-02-01 PROCEDURE — 99232 SBSQ HOSP IP/OBS MODERATE 35: CPT

## 2025-02-01 RX ADMIN — OSELTAMIVIR PHOSPHATE 75 MILLIGRAM(S): 75 CAPSULE ORAL at 20:48

## 2025-02-01 RX ADMIN — ACETAMINOPHEN, DIPHENHYDRAMINE HCL, PHENYLEPHRINE HCL 5 MILLIGRAM(S): 325; 25; 5 TABLET ORAL at 20:49

## 2025-02-01 RX ADMIN — Medication 1 TABLET(S): at 09:08

## 2025-02-01 RX ADMIN — OLANZAPINE 7.5 MILLIGRAM(S): 10 TABLET, FILM COATED ORAL at 20:49

## 2025-02-01 RX ADMIN — Medication 1 APPLICATION(S): at 18:35

## 2025-02-01 RX ADMIN — Medication 100 MILLIGRAM(S): at 09:08

## 2025-02-01 RX ADMIN — ESCITALOPRAM 10 MILLIGRAM(S): 10 TABLET, FILM COATED ORAL at 09:09

## 2025-02-02 PROCEDURE — 99231 SBSQ HOSP IP/OBS SF/LOW 25: CPT

## 2025-02-02 RX ADMIN — ACETAMINOPHEN, DIPHENHYDRAMINE HCL, PHENYLEPHRINE HCL 5 MILLIGRAM(S): 325; 25; 5 TABLET ORAL at 20:41

## 2025-02-02 RX ADMIN — OSELTAMIVIR PHOSPHATE 75 MILLIGRAM(S): 75 CAPSULE ORAL at 20:41

## 2025-02-02 RX ADMIN — Medication 100 MILLIGRAM(S): at 09:52

## 2025-02-02 RX ADMIN — OLANZAPINE 7.5 MILLIGRAM(S): 10 TABLET, FILM COATED ORAL at 20:41

## 2025-02-02 RX ADMIN — ESCITALOPRAM 10 MILLIGRAM(S): 10 TABLET, FILM COATED ORAL at 09:52

## 2025-02-02 RX ADMIN — Medication 1 TABLET(S): at 09:53

## 2025-02-03 PROCEDURE — 99232 SBSQ HOSP IP/OBS MODERATE 35: CPT

## 2025-02-03 RX ADMIN — Medication 100 MILLIGRAM(S): at 09:10

## 2025-02-03 RX ADMIN — Medication 1 APPLICATION(S): at 09:10

## 2025-02-03 RX ADMIN — ACETAMINOPHEN, DIPHENHYDRAMINE HCL, PHENYLEPHRINE HCL 5 MILLIGRAM(S): 325; 25; 5 TABLET ORAL at 20:09

## 2025-02-03 RX ADMIN — ESCITALOPRAM 10 MILLIGRAM(S): 10 TABLET, FILM COATED ORAL at 09:10

## 2025-02-03 RX ADMIN — Medication 1 APPLICATION(S): at 18:25

## 2025-02-03 RX ADMIN — OSELTAMIVIR PHOSPHATE 75 MILLIGRAM(S): 75 CAPSULE ORAL at 20:09

## 2025-02-03 RX ADMIN — OLANZAPINE 7.5 MILLIGRAM(S): 10 TABLET, FILM COATED ORAL at 20:08

## 2025-02-03 RX ADMIN — Medication 1 TABLET(S): at 09:10

## 2025-02-03 NOTE — BH INPATIENT PSYCHIATRY PROGRESS NOTE - OTHER
slowly improving gradually improving thinking slowly becoming more linear  becoming gradually more spontaneous " I think I'm feeling better." selectively social and slowly improving affect becoming gradually stevens in range and intensity

## 2025-02-04 PROCEDURE — 99232 SBSQ HOSP IP/OBS MODERATE 35: CPT

## 2025-02-04 RX ADMIN — Medication 1 APPLICATION(S): at 09:26

## 2025-02-04 RX ADMIN — ESCITALOPRAM 10 MILLIGRAM(S): 10 TABLET, FILM COATED ORAL at 09:26

## 2025-02-04 RX ADMIN — Medication 1 TABLET(S): at 09:26

## 2025-02-04 RX ADMIN — OSELTAMIVIR PHOSPHATE 75 MILLIGRAM(S): 75 CAPSULE ORAL at 20:12

## 2025-02-04 RX ADMIN — OLANZAPINE 7.5 MILLIGRAM(S): 10 TABLET, FILM COATED ORAL at 20:11

## 2025-02-04 RX ADMIN — ACETAMINOPHEN, DIPHENHYDRAMINE HCL, PHENYLEPHRINE HCL 5 MILLIGRAM(S): 325; 25; 5 TABLET ORAL at 20:12

## 2025-02-04 RX ADMIN — Medication 100 MILLIGRAM(S): at 09:26

## 2025-02-04 NOTE — BH SAFETY PLAN - WARNING SIGN 5
Thinking I was a bad person. Thought I was going to be on the news. Thought my family would be better off without me.

## 2025-02-04 NOTE — BH SAFETY PLAN - LOCAL URGENT CARE NAME
[facility] DASH Program (psychiatric urgent care and mobile crisis unit)

## 2025-02-04 NOTE — BH INPATIENT PSYCHIATRY PROGRESS NOTE - OTHER
affect becoming gradually stevens in range and intensity selectively social and slowly improving " I think I'm feeling better." becoming gradually more spontaneous slowly improving gradually improving thinking slowly becoming more linear

## 2025-02-04 NOTE — BH INPATIENT PSYCHIATRY PROGRESS NOTE - NSBHFUPINTERVALCCFT_PSY_A_CORE
" I'm feeling better. I'm learning about coping skills and mindfulness."    On 2/4/25 this writer filed a mandatory SAFE ACT report  and then called the pt's mother , an RN who was familiar with the SAFE ACT mandate.   The pt's mother reported that she and her  had removed a gun used by the pt and his cousin at a local target range from the home which they share with the pt when the pt was admitted to [facility] the week of 1/27/25 .   " I'm feeling better. I'm learning about coping skills and mindfulness."    On 2/4/25 this writer filed a mandatory SAFE ACT report  and then called the pt's mother Thelma ( tel [phone]) , an RN who was familiar with the SAFE ACT mandate.   The pt's mother reported that she and her  had removed a gun used by the pt and his cousin at a local target range from the home which they share with the pt when the pt was admitted to [facility] the week of 1/27/25 .

## 2025-02-05 PROCEDURE — 99232 SBSQ HOSP IP/OBS MODERATE 35: CPT

## 2025-02-05 RX ORDER — ESCITALOPRAM 10 MG/1
15 TABLET, FILM COATED ORAL DAILY
Refills: 0 | Status: DISCONTINUED | OUTPATIENT
Start: 2025-02-06 | End: 2025-02-07

## 2025-02-05 RX ORDER — OLANZAPINE 10 MG/1
5 TABLET, FILM COATED ORAL AT BEDTIME
Refills: 0 | Status: DISCONTINUED | OUTPATIENT
Start: 2025-02-05 | End: 2025-02-07

## 2025-02-05 RX ADMIN — ESCITALOPRAM 10 MILLIGRAM(S): 10 TABLET, FILM COATED ORAL at 08:57

## 2025-02-05 RX ADMIN — OSELTAMIVIR PHOSPHATE 75 MILLIGRAM(S): 75 CAPSULE ORAL at 20:46

## 2025-02-05 RX ADMIN — Medication 1 TABLET(S): at 08:56

## 2025-02-05 RX ADMIN — Medication 100 MILLIGRAM(S): at 08:57

## 2025-02-05 RX ADMIN — ACETAMINOPHEN, DIPHENHYDRAMINE HCL, PHENYLEPHRINE HCL 5 MILLIGRAM(S): 325; 25; 5 TABLET ORAL at 20:46

## 2025-02-05 RX ADMIN — Medication 1 APPLICATION(S): at 08:56

## 2025-02-05 RX ADMIN — OLANZAPINE 5 MILLIGRAM(S): 10 TABLET, FILM COATED ORAL at 20:45

## 2025-02-05 NOTE — BH INPATIENT PSYCHIATRY PROGRESS NOTE - OTHER
affect becoming gradually stevens in range and intensity becoming gradually more spontaneous gradually improving thinking slowly becoming more linear  " I think I'm feeling better." selectively social and slowly improving slowly improving

## 2025-02-05 NOTE — BH INPATIENT PSYCHIATRY PROGRESS NOTE - NSBHFUPINTERVALCCFT_PSY_A_CORE
" I am doing ok. I feel  a little tired"    On 2/4/25 this writer filed a mandatory SAFE ACT report  and then called the pt's mother Thelma ( tel [phone]) , an RN who was familiar with the SAFE ACT mandate.   The pt's mother reported that she and her  had removed a gun used by the pt and his cousin at a local target range from the home which they share with the pt when the pt was admitted to [facility] the week of 1/27/25 .

## 2025-02-06 PROCEDURE — 99232 SBSQ HOSP IP/OBS MODERATE 35: CPT

## 2025-02-06 RX ADMIN — Medication 1 TABLET(S): at 09:18

## 2025-02-06 RX ADMIN — OLANZAPINE 5 MILLIGRAM(S): 10 TABLET, FILM COATED ORAL at 20:18

## 2025-02-06 RX ADMIN — ESCITALOPRAM 15 MILLIGRAM(S): 10 TABLET, FILM COATED ORAL at 09:16

## 2025-02-06 RX ADMIN — ACETAMINOPHEN, DIPHENHYDRAMINE HCL, PHENYLEPHRINE HCL 5 MILLIGRAM(S): 325; 25; 5 TABLET ORAL at 20:17

## 2025-02-06 RX ADMIN — Medication 100 MILLIGRAM(S): at 09:16

## 2025-02-06 RX ADMIN — OSELTAMIVIR PHOSPHATE 75 MILLIGRAM(S): 75 CAPSULE ORAL at 20:17

## 2025-02-06 NOTE — BH INPATIENT PSYCHIATRY PROGRESS NOTE - NSBHFUPINTERVALCCFT_PSY_A_CORE
" I'm doing ok."    On 2/4/25 this writer filed a mandatory SAFE ACT report  and then called the pt's mother Thelma ( tel [phone]) , an RN who was familiar with the SAFE ACT mandate.   The pt's mother reported that she and her  had removed a gun used by the pt and his cousin at a local target range from the home which they share with the pt when the pt was admitted to [facility] the week of 1/27/25 .   Pt had staples removed by surgical PA on 2/6/25 without incident.

## 2025-02-06 NOTE — BH INPATIENT PSYCHIATRY PROGRESS NOTE - OTHER
" I think I'm feeling better." becoming gradually more spontaneous selectively social and slowly improving thinking slowly becoming more linear  affect becoming gradually stevens in range and intensity gradually improving slowly improving

## 2025-02-07 PROCEDURE — 99232 SBSQ HOSP IP/OBS MODERATE 35: CPT

## 2025-02-07 RX ORDER — OLANZAPINE 10 MG/1
7.5 TABLET, FILM COATED ORAL AT BEDTIME
Refills: 0 | Status: DISCONTINUED | OUTPATIENT
Start: 2025-02-07 | End: 2025-02-11

## 2025-02-07 RX ORDER — ESCITALOPRAM 10 MG/1
20 TABLET, FILM COATED ORAL DAILY
Refills: 0 | Status: DISCONTINUED | OUTPATIENT
Start: 2025-02-08 | End: 2025-02-11

## 2025-02-07 RX ADMIN — ACETAMINOPHEN, DIPHENHYDRAMINE HCL, PHENYLEPHRINE HCL 5 MILLIGRAM(S): 325; 25; 5 TABLET ORAL at 21:53

## 2025-02-07 RX ADMIN — OLANZAPINE 7.5 MILLIGRAM(S): 10 TABLET, FILM COATED ORAL at 21:53

## 2025-02-07 RX ADMIN — Medication 1 TABLET(S): at 09:27

## 2025-02-07 RX ADMIN — ESCITALOPRAM 15 MILLIGRAM(S): 10 TABLET, FILM COATED ORAL at 09:27

## 2025-02-07 RX ADMIN — Medication 100 MILLIGRAM(S): at 09:27

## 2025-02-07 RX ADMIN — OSELTAMIVIR PHOSPHATE 75 MILLIGRAM(S): 75 CAPSULE ORAL at 21:53

## 2025-02-07 NOTE — BH INPATIENT PSYCHIATRY PROGRESS NOTE - OTHER
becoming gradually more spontaneous slowly improving selectively social and slowly improving gradually improving thinking slowly becoming more linear  " I think I'm feeling better." affect becoming gradually stevens in range and intensity

## 2025-02-07 NOTE — BH INPATIENT PSYCHIATRY PROGRESS NOTE - NSBHFUPINTERVALCCFT_PSY_A_CORE
" I'm ok. I didn't sleep well since the Zyprexa was lowered."     On 2/4/25 this writer filed a mandatory SAFE ACT report  and then called the pt's mother Thelma ( tel [phone]) , an RN who was familiar with the SAFE ACT mandate.   The pt's mother reported that she and her  had removed a gun used by the pt and his cousin at a local target range from the home which they share with the pt when the pt was admitted to [facility] the week of 1/27/25 .   Pt had staples removed by surgical PA on 2/6/25 without incident.

## 2025-02-08 RX ADMIN — Medication 100 MILLIGRAM(S): at 09:34

## 2025-02-08 RX ADMIN — Medication 1 TABLET(S): at 09:33

## 2025-02-08 RX ADMIN — ESCITALOPRAM 20 MILLIGRAM(S): 10 TABLET, FILM COATED ORAL at 09:34

## 2025-02-08 RX ADMIN — OLANZAPINE 7.5 MILLIGRAM(S): 10 TABLET, FILM COATED ORAL at 20:14

## 2025-02-08 RX ADMIN — ACETAMINOPHEN, DIPHENHYDRAMINE HCL, PHENYLEPHRINE HCL 5 MILLIGRAM(S): 325; 25; 5 TABLET ORAL at 20:14

## 2025-02-09 RX ADMIN — ACETAMINOPHEN, DIPHENHYDRAMINE HCL, PHENYLEPHRINE HCL 5 MILLIGRAM(S): 325; 25; 5 TABLET ORAL at 20:42

## 2025-02-09 RX ADMIN — Medication 100 MILLIGRAM(S): at 09:58

## 2025-02-09 RX ADMIN — OLANZAPINE 7.5 MILLIGRAM(S): 10 TABLET, FILM COATED ORAL at 20:42

## 2025-02-09 RX ADMIN — Medication 1 TABLET(S): at 09:58

## 2025-02-09 RX ADMIN — ESCITALOPRAM 20 MILLIGRAM(S): 10 TABLET, FILM COATED ORAL at 09:58

## 2025-02-10 PROCEDURE — 99232 SBSQ HOSP IP/OBS MODERATE 35: CPT

## 2025-02-10 RX ADMIN — ESCITALOPRAM 20 MILLIGRAM(S): 10 TABLET, FILM COATED ORAL at 08:45

## 2025-02-10 RX ADMIN — ACETAMINOPHEN, DIPHENHYDRAMINE HCL, PHENYLEPHRINE HCL 5 MILLIGRAM(S): 325; 25; 5 TABLET ORAL at 21:39

## 2025-02-10 RX ADMIN — Medication 1 TABLET(S): at 08:45

## 2025-02-10 RX ADMIN — Medication 100 MILLIGRAM(S): at 08:45

## 2025-02-10 RX ADMIN — OLANZAPINE 7.5 MILLIGRAM(S): 10 TABLET, FILM COATED ORAL at 21:40

## 2025-02-10 NOTE — BH DISCHARGE NOTE NURSING/SOCIAL WORK/PSYCH REHAB - NSBHCRISISRESOURCESOTHER_PSY_ALL_CORE_FT
[facility] 5N - 24/7 - [phone]  Kaylah Alfredo, Nurse Manager; Dr. Bustamante, Psychiatrist; Jessy Najera/Claudette Baxter, Social Workers  Please feel free to call 5N and speak to a clinician any time. If your questions are about medications, labwork or your treatment on 5N please ask to speak to Dr. Bustamante. If your symptoms return or worsen please go to your nearest hospital Emergency Room.

## 2025-02-10 NOTE — BH INPATIENT PSYCHIATRY PROGRESS NOTE - NSBHMSESPABN_PSY_A_CORE
Soft volume/Increased latency
Soft volume/Decreased productivity/Increased latency
Soft volume/Decreased productivity/Increased latency/Other
Never smoker
Soft volume/Decreased productivity/Increased latency/Other
Soft volume/Decreased productivity/Increased latency
Soft volume/Decreased productivity/Increased latency/Other
Soft volume/Decreased productivity/Increased latency
Soft volume/Decreased productivity/Increased latency/Other

## 2025-02-10 NOTE — BH DISCHARGE NOTE NURSING/SOCIAL WORK/PSYCH REHAB - PATIENT PORTAL LINK FT
You can access the FollowMyHealth Patient Portal offered by [facility] by registering at the following website: [email or web address]. By joining [employer] [facility]’s FollowMyHealth portal, you will also be able to view your health information using other applications (apps) compatible with our system.

## 2025-02-10 NOTE — BH DISCHARGE NOTE NURSING/SOCIAL WORK/PSYCH REHAB - NSCDUDCCRISIS_PSY_A_CORE
.[facility] 1 [phone] 168-HNMP (2334) Website: www.[facility].org/.  [facility] - [facility] – Crisis Care for Children, Adults and Families  [address] [place] [address]  Mobile Crisis Hotline – (731) 267-9561/.National Suicide Prevention Lifeline 9 (201) 451-0058/.  [facility]  1 (735) LIFENET (427-6552)/.  [facility]  (924) 601-8921/.  [facility] Response Crisis Hotline  (394) 744-8607  24 hour telephone crisis intervention and suicide prevention hotline concerned with all mental health issues/.  [facility]  [address] [place] [address] [phone]   Hours:  Monday through Friday from 9 AM to 3 PM/Other.../988 Suicide and Crisis Lifeline

## 2025-02-10 NOTE — BH DISCHARGE NOTE NURSING/SOCIAL WORK/PSYCH REHAB - NSDCADDINFO1FT_PSY_ALL_CORE
Patient has an IN-PERSON appointment for intake for continuing mental health treatment at the [facility] in [place] on Thursday, 2/13/2025 at 11:15 AM with , Amanuel and , Fide.  Patient should bring photo ID and Insurance Card, and be prepared to spend 1 1/2 - 2 hours at this appointment.  Patient is also being referred to their SPAR program for added outpatient support. Patient has an IN-PERSON appointment for intake for continuing mental health treatment at the [facility] in [place] on Thursday, 2/13/2025 at 11:15 AM with , Amanuel and , Fide.  Patient should bring photo ID and Insurance Card, and be prepared to spend 1 1/2 - 2 hours at this appointment.  Patient is also being referred to their SPAR program for added outpatient support.     Pt to address any issues that arise regarding substance use in treatment with [facility].     Please contact Dr. Sophia Bustamante for medication or treatment questions, lab results or any other concerns at [phone]. Handoff provided to your outpatient provider, [facility] Anal[facility] [place].  Patient has agreed to receive a copy of their Nursing/Social Work/MD discharge note in the patient portal.  If needed, please contact [facility], 5N, 24/7 days a week and speak with Kaylah Alfredo RN Nurse manager or any 5N staff member.  Please return to the ED if symptoms worsen or return.

## 2025-02-10 NOTE — BH DISCHARGE NOTE NURSING/SOCIAL WORK/PSYCH REHAB - NSDCPLANREVIEWED_PSY_ALL_CORE
The discharge note was reviewed with patient by nursing and social workerl Patient is agreeable with receiving a copy of his MD/Nursing/SW discharge note via the Patient Portal.  A hand-off has also been provided to your new o/p treatment provider, [facility] in [place]./Yes Doctor, Nurse, and  reviewed discharge plan with patient who agrees and accepts plan.  Pt provided with a copy of discharge paperwork and agrees to receive a copy of the discharge note in the patient portal.  Pt appropriately dressed for discharge and is transported home by family, friend or taxi to ensure safe arrival home./Yes

## 2025-02-10 NOTE — BH DISCHARGE NOTE NURSING/SOCIAL WORK/PSYCH REHAB - NSDCPRRECOMMEND_PSY_ALL_CORE
It is recommended that pt refer to safety plan and stay connected to outpatient treatment for support in the community.

## 2025-02-10 NOTE — BH INPATIENT PSYCHIATRY PROGRESS NOTE - OTHER
affect becoming gradually stevens in range and intensity gradually improving becoming gradually more spontaneous slowly improving thinking slowly becoming more linear  " I think I'm feeling better." selectively social and slowly improving

## 2025-02-10 NOTE — BH INPATIENT PSYCHIATRY PROGRESS NOTE - NSBHFUPINTERVALCCFT_PSY_A_CORE
" I think I'm doing good now. I feel good."     On 2/4/25 this writer filed a mandatory SAFE ACT report  and then called the pt's mother Thelma ( tel [phone]) , an RN who was familiar with the SAFE ACT mandate.   The pt's mother reported that she and her  had removed a gun used by the pt and his cousin at a local target range from the home which they share with the pt when the pt was admitted to [facility] the week of 1/27/25 .   Pt had staples removed by surgical PA on 2/6/25 without incident.

## 2025-02-11 VITALS — OXYGEN SATURATION: 100 % | RESPIRATION RATE: 16 BRPM | TEMPERATURE: 98 F

## 2025-02-11 PROCEDURE — 99239 HOSP IP/OBS DSCHRG MGMT >30: CPT

## 2025-02-11 RX ORDER — ESCITALOPRAM 10 MG/1
1 TABLET, FILM COATED ORAL
Qty: 14 | Refills: 1
Start: 2025-02-11 | End: 2025-03-10

## 2025-02-11 RX ORDER — ACETAMINOPHEN, DIPHENHYDRAMINE HCL, PHENYLEPHRINE HCL 325; 25; 5 MG/1; MG/1; MG/1
1 TABLET ORAL
Qty: 0 | Refills: 0 | DISCHARGE
Start: 2025-02-11

## 2025-02-11 RX ORDER — IRON/FOLIC ACID/C/B6/B12/ZINC 150-1.25MG
1 TABLET ORAL
Qty: 0 | Refills: 0 | DISCHARGE
Start: 2025-02-11

## 2025-02-11 RX ORDER — OLANZAPINE 10 MG/1
1 TABLET, FILM COATED ORAL
Qty: 14 | Refills: 1
Start: 2025-02-11 | End: 2025-03-10

## 2025-02-11 RX ADMIN — ESCITALOPRAM 20 MILLIGRAM(S): 10 TABLET, FILM COATED ORAL at 09:20

## 2025-02-11 RX ADMIN — Medication 1 TABLET(S): at 09:20

## 2025-02-11 RX ADMIN — Medication 100 MILLIGRAM(S): at 09:20

## 2025-02-11 NOTE — BH INPATIENT PSYCHIATRY PROGRESS NOTE - CURRENT MEDICATION
MEDICATIONS  (STANDING):  escitalopram 10 milliGRAM(s) Oral daily  influenza   Vaccine 0.5 milliLiter(s) IntraMuscular once  melatonin 5 milliGRAM(s) Oral at bedtime  multivitamin/minerals 1 Tablet(s) Oral daily  OLANZapine 7.5 milliGRAM(s) Oral at bedtime  oseltamivir 75 milliGRAM(s) Oral every 24 hours  thiamine 100 milliGRAM(s) Oral daily    MEDICATIONS  (PRN):  acetaminophen     Tablet .. 650 milliGRAM(s) Oral every 6 hours PRN Temp greater or equal to 38C (100.4F), Mild Pain (1 - 3), Moderate Pain (4 - 6)  aluminum hydroxide/magnesium hydroxide/simethicone Suspension 30 milliLiter(s) Oral every 6 hours PRN Dyspepsia  bacitracin   Ointment 1 Application(s) Topical two times a day PRN to wound  ibuprofen  Tablet. 600 milliGRAM(s) Oral every 6 hours PRN Moderate Pain (4 - 6), Severe Pain (7 - 10)  LORazepam     Tablet 1 milliGRAM(s) Oral every 6 hours PRN mild to moderate anxiety/agitation due to mood d/o  magnesium hydroxide Suspension 30 milliLiter(s) Oral daily PRN Constipation  
MEDICATIONS  (STANDING):  escitalopram 10 milliGRAM(s) Oral daily  influenza   Vaccine 0.5 milliLiter(s) IntraMuscular once  melatonin 5 milliGRAM(s) Oral at bedtime  multivitamin/minerals 1 Tablet(s) Oral daily  OLANZapine 7.5 milliGRAM(s) Oral at bedtime  oseltamivir 75 milliGRAM(s) Oral every 24 hours  thiamine 100 milliGRAM(s) Oral daily    MEDICATIONS  (PRN):  acetaminophen     Tablet .. 650 milliGRAM(s) Oral every 6 hours PRN Temp greater or equal to 38C (100.4F), Mild Pain (1 - 3), Moderate Pain (4 - 6)  aluminum hydroxide/magnesium hydroxide/simethicone Suspension 30 milliLiter(s) Oral every 6 hours PRN Dyspepsia  bacitracin   Ointment 1 Application(s) Topical two times a day PRN to wound  ibuprofen  Tablet. 600 milliGRAM(s) Oral every 6 hours PRN Moderate Pain (4 - 6), Severe Pain (7 - 10)  LORazepam     Tablet 1 milliGRAM(s) Oral every 6 hours PRN mild to moderate anxiety/agitation due to mood d/o  magnesium hydroxide Suspension 30 milliLiter(s) Oral daily PRN Constipation  
MEDICATIONS  (STANDING):  escitalopram 20 milliGRAM(s) Oral daily  influenza   Vaccine 0.5 milliLiter(s) IntraMuscular once  melatonin 5 milliGRAM(s) Oral at bedtime  multivitamin/minerals 1 Tablet(s) Oral daily  OLANZapine 7.5 milliGRAM(s) Oral at bedtime  thiamine 100 milliGRAM(s) Oral daily    MEDICATIONS  (PRN):  acetaminophen     Tablet .. 650 milliGRAM(s) Oral every 6 hours PRN Temp greater or equal to 38C (100.4F), Mild Pain (1 - 3), Moderate Pain (4 - 6)  aluminum hydroxide/magnesium hydroxide/simethicone Suspension 30 milliLiter(s) Oral every 6 hours PRN Dyspepsia  bacitracin   Ointment 1 Application(s) Topical two times a day PRN to wound  ibuprofen  Tablet. 600 milliGRAM(s) Oral every 6 hours PRN Moderate Pain (4 - 6), Severe Pain (7 - 10)  magnesium hydroxide Suspension 30 milliLiter(s) Oral daily PRN Constipation  
MEDICATIONS  (STANDING):  influenza   Vaccine 0.5 milliLiter(s) IntraMuscular once  melatonin 5 milliGRAM(s) Oral at bedtime  multivitamin/minerals 1 Tablet(s) Oral daily  OLANZapine 7.5 milliGRAM(s) Oral at bedtime  oseltamivir 75 milliGRAM(s) Oral every 24 hours  thiamine 100 milliGRAM(s) Oral daily    MEDICATIONS  (PRN):  acetaminophen     Tablet .. 650 milliGRAM(s) Oral every 6 hours PRN Temp greater or equal to 38C (100.4F), Mild Pain (1 - 3), Moderate Pain (4 - 6)  aluminum hydroxide/magnesium hydroxide/simethicone Suspension 30 milliLiter(s) Oral every 6 hours PRN Dyspepsia  bacitracin   Ointment 1 Application(s) Topical two times a day PRN to wound  ibuprofen  Tablet. 600 milliGRAM(s) Oral every 6 hours PRN Moderate Pain (4 - 6), Severe Pain (7 - 10)  magnesium hydroxide Suspension 30 milliLiter(s) Oral daily PRN Constipation  
MEDICATIONS  (STANDING):  escitalopram 5 milliGRAM(s) Oral daily  influenza   Vaccine 0.5 milliLiter(s) IntraMuscular once  melatonin 5 milliGRAM(s) Oral at bedtime  multivitamin/minerals 1 Tablet(s) Oral daily  OLANZapine Disintegrating Tablet 5 milliGRAM(s) Oral at bedtime  oseltamivir 75 milliGRAM(s) Oral every 24 hours  thiamine 100 milliGRAM(s) Oral daily    MEDICATIONS  (PRN):  acetaminophen     Tablet .. 650 milliGRAM(s) Oral every 6 hours PRN Temp greater or equal to 38C (100.4F), Mild Pain (1 - 3), Moderate Pain (4 - 6)  aluminum hydroxide/magnesium hydroxide/simethicone Suspension 30 milliLiter(s) Oral every 6 hours PRN Dyspepsia  ibuprofen  Tablet. 600 milliGRAM(s) Oral every 6 hours PRN Moderate Pain (4 - 6), Severe Pain (7 - 10)  LORazepam     Tablet 1 milliGRAM(s) Oral every 6 hours PRN mild to moderate anxiety/agitation due to mood d/o  magnesium hydroxide Suspension 30 milliLiter(s) Oral daily PRN Constipation  
MEDICATIONS  (STANDING):  escitalopram 10 milliGRAM(s) Oral daily  influenza   Vaccine 0.5 milliLiter(s) IntraMuscular once  melatonin 5 milliGRAM(s) Oral at bedtime  multivitamin/minerals 1 Tablet(s) Oral daily  OLANZapine 7.5 milliGRAM(s) Oral at bedtime  oseltamivir 75 milliGRAM(s) Oral every 24 hours  thiamine 100 milliGRAM(s) Oral daily    MEDICATIONS  (PRN):  acetaminophen     Tablet .. 650 milliGRAM(s) Oral every 6 hours PRN Temp greater or equal to 38C (100.4F), Mild Pain (1 - 3), Moderate Pain (4 - 6)  aluminum hydroxide/magnesium hydroxide/simethicone Suspension 30 milliLiter(s) Oral every 6 hours PRN Dyspepsia  bacitracin   Ointment 1 Application(s) Topical two times a day PRN to wound  ibuprofen  Tablet. 600 milliGRAM(s) Oral every 6 hours PRN Moderate Pain (4 - 6), Severe Pain (7 - 10)  LORazepam     Tablet 1 milliGRAM(s) Oral every 6 hours PRN mild to moderate anxiety/agitation due to mood d/o  magnesium hydroxide Suspension 30 milliLiter(s) Oral daily PRN Constipation  
MEDICATIONS  (STANDING):  escitalopram 5 milliGRAM(s) Oral daily  influenza   Vaccine 0.5 milliLiter(s) IntraMuscular once  melatonin 5 milliGRAM(s) Oral at bedtime  multivitamin/minerals 1 Tablet(s) Oral daily  OLANZapine Disintegrating Tablet 5 milliGRAM(s) Oral at bedtime  oseltamivir 75 milliGRAM(s) Oral every 24 hours  thiamine 100 milliGRAM(s) Oral daily    MEDICATIONS  (PRN):  acetaminophen     Tablet .. 650 milliGRAM(s) Oral every 6 hours PRN Temp greater or equal to 38C (100.4F), Mild Pain (1 - 3), Moderate Pain (4 - 6)  aluminum hydroxide/magnesium hydroxide/simethicone Suspension 30 milliLiter(s) Oral every 6 hours PRN Dyspepsia  ibuprofen  Tablet. 600 milliGRAM(s) Oral every 6 hours PRN Moderate Pain (4 - 6), Severe Pain (7 - 10)  LORazepam     Tablet 1 milliGRAM(s) Oral every 6 hours PRN mild to moderate anxiety/agitation due to mood d/o  magnesium hydroxide Suspension 30 milliLiter(s) Oral daily PRN Constipation  
MEDICATIONS  (STANDING):  escitalopram 10 milliGRAM(s) Oral daily  influenza   Vaccine 0.5 milliLiter(s) IntraMuscular once  melatonin 5 milliGRAM(s) Oral at bedtime  multivitamin/minerals 1 Tablet(s) Oral daily  OLANZapine 7.5 milliGRAM(s) Oral at bedtime  oseltamivir 75 milliGRAM(s) Oral every 24 hours  thiamine 100 milliGRAM(s) Oral daily    MEDICATIONS  (PRN):  acetaminophen     Tablet .. 650 milliGRAM(s) Oral every 6 hours PRN Temp greater or equal to 38C (100.4F), Mild Pain (1 - 3), Moderate Pain (4 - 6)  aluminum hydroxide/magnesium hydroxide/simethicone Suspension 30 milliLiter(s) Oral every 6 hours PRN Dyspepsia  bacitracin   Ointment 1 Application(s) Topical two times a day PRN to wound  ibuprofen  Tablet. 600 milliGRAM(s) Oral every 6 hours PRN Moderate Pain (4 - 6), Severe Pain (7 - 10)  LORazepam     Tablet 1 milliGRAM(s) Oral every 6 hours PRN mild to moderate anxiety/agitation due to mood d/o  magnesium hydroxide Suspension 30 milliLiter(s) Oral daily PRN Constipation  
MEDICATIONS  (STANDING):  escitalopram 15 milliGRAM(s) Oral daily  influenza   Vaccine 0.5 milliLiter(s) IntraMuscular once  melatonin 5 milliGRAM(s) Oral at bedtime  multivitamin/minerals 1 Tablet(s) Oral daily  OLANZapine 5 milliGRAM(s) Oral at bedtime  oseltamivir 75 milliGRAM(s) Oral every 24 hours  thiamine 100 milliGRAM(s) Oral daily    MEDICATIONS  (PRN):  acetaminophen     Tablet .. 650 milliGRAM(s) Oral every 6 hours PRN Temp greater or equal to 38C (100.4F), Mild Pain (1 - 3), Moderate Pain (4 - 6)  aluminum hydroxide/magnesium hydroxide/simethicone Suspension 30 milliLiter(s) Oral every 6 hours PRN Dyspepsia  bacitracin   Ointment 1 Application(s) Topical two times a day PRN to wound  ibuprofen  Tablet. 600 milliGRAM(s) Oral every 6 hours PRN Moderate Pain (4 - 6), Severe Pain (7 - 10)  magnesium hydroxide Suspension 30 milliLiter(s) Oral daily PRN Constipation  
MEDICATIONS  (STANDING):  influenza   Vaccine 0.5 milliLiter(s) IntraMuscular once  melatonin 5 milliGRAM(s) Oral at bedtime  multivitamin/minerals 1 Tablet(s) Oral daily  OLANZapine 5 milliGRAM(s) Oral at bedtime  oseltamivir 75 milliGRAM(s) Oral every 24 hours  thiamine 100 milliGRAM(s) Oral daily    MEDICATIONS  (PRN):  acetaminophen     Tablet .. 650 milliGRAM(s) Oral every 6 hours PRN Temp greater or equal to 38C (100.4F), Mild Pain (1 - 3), Moderate Pain (4 - 6)  aluminum hydroxide/magnesium hydroxide/simethicone Suspension 30 milliLiter(s) Oral every 6 hours PRN Dyspepsia  bacitracin   Ointment 1 Application(s) Topical two times a day PRN to wound  ibuprofen  Tablet. 600 milliGRAM(s) Oral every 6 hours PRN Moderate Pain (4 - 6), Severe Pain (7 - 10)  LORazepam     Tablet 1 milliGRAM(s) Oral every 6 hours PRN mild to moderate anxiety/agitation due to mood d/o  magnesium hydroxide Suspension 30 milliLiter(s) Oral daily PRN Constipation  
MEDICATIONS  (STANDING):  escitalopram 20 milliGRAM(s) Oral daily  influenza   Vaccine 0.5 milliLiter(s) IntraMuscular once  melatonin 5 milliGRAM(s) Oral at bedtime  multivitamin/minerals 1 Tablet(s) Oral daily  OLANZapine 7.5 milliGRAM(s) Oral at bedtime  thiamine 100 milliGRAM(s) Oral daily    MEDICATIONS  (PRN):  acetaminophen     Tablet .. 650 milliGRAM(s) Oral every 6 hours PRN Temp greater or equal to 38C (100.4F), Mild Pain (1 - 3), Moderate Pain (4 - 6)  aluminum hydroxide/magnesium hydroxide/simethicone Suspension 30 milliLiter(s) Oral every 6 hours PRN Dyspepsia  bacitracin   Ointment 1 Application(s) Topical two times a day PRN to wound  ibuprofen  Tablet. 600 milliGRAM(s) Oral every 6 hours PRN Moderate Pain (4 - 6), Severe Pain (7 - 10)  magnesium hydroxide Suspension 30 milliLiter(s) Oral daily PRN Constipation

## 2025-02-11 NOTE — BH INPATIENT PSYCHIATRY PROGRESS NOTE - NSTXSUICIDINTERMD_PSY_ALL_CORE
1.Pt amenable to continue a trial of Zyprexa Zydis 5 mg po qhs to alleviate pt depression with psychotic features  2.Pt amenable to a trial of Lexapro 5 mg po q am to alleviate the pt's anxious depression  3.Pt  encouraged to attend therapy groups as tolerated  4.Clinical pt collateral history to be obtained from the pt and his family with pt permission (given to hospital staff)  5.Hospitalist H and P and pt surgical follow up after DC home s/p pt SIB via stabbing self in the abdomen prior to this admission to hospital after pt initial admission to Surgery at [facility] on 1/27/25.  6.Disposition and safety  planning ongoing 
1.Pt amenable to continue a trial of Zyprexa Zydis 5 mg po qhs to alleviate pt depression with psychotic features  2.Pt amenable to a trial of Lexapro 5 mg po q am to alleviate the pt's anxious depression  3.Pt  encouraged to attend therapy groups as tolerated  4.Clinical pt collateral history to be obtained from the pt and his family with pt permission (given to hospital staff)  5.Hospitalist H and P and pt surgical follow up after DC home s/p pt SIB via stabbing self in the abdomen prior to this admission to hospital after pt initial admission to Surgery at [facility] on 1/27/25.  6.Disposition and safety  planning ongoing 
1.Pt amenable to continue a trial of Zyprexa Zydis 5 mg po qhs to alleviate pt depression with psychotic features  2.Pt amenable to a trial of Lexapro increased to 15 mg po q am to alleviate the pt's anxious depression  3.Pt  encouraged to attend therapy groups as tolerated  4.Clinical pt collateral history to be obtained from the pt and his family with pt permission (given to hospital staff)  5.Hospitalist H and P and pt surgical follow up after DC home s/p pt SIB via stabbing self in the abdomen prior to this admission to hospital after pt initial admission to Surgery at [facility] on 1/27/25.  6.Disposition and safety  planning ongoing 
1.Pt amenable to continue a trial of Zyprexa Zydis 5 mg po qhs to alleviate pt depression with psychotic features  2.Pt amenable to a trial of Lexapro 5 mg po q am to alleviate the pt's anxious depression  3.Pt  encouraged to attend therapy groups as tolerated  4.Clinical pt collateral history to be obtained from the pt and his family with pt permission (given to hospital staff)  5.Hospitalist H and P and pt surgical follow up after DC home s/p pt SIB via stabbing self in the abdomen prior to this admission to hospital after pt initial admission to Surgery at [facility] on 1/27/25.  6.Disposition and safety  planning ongoing 
1.Pt amenable to continue a trial of Zyprexa Zydis 5 mg po qhs to alleviate pt depression with psychotic features  2.Pt amenable to a trial of Lexapro 5 mg po q am to alleviate the pt's anxious depression  3.Pt  encouraged to attend therapy groups as tolerated  4.Clinical pt collateral history to be obtained from the pt and his family with pt permission (given to hospital staff)  5.Hospitalist H and P and pt surgical follow up after DC home s/p pt SIB via stabbing self in the abdomen prior to this admission to hospital after pt initial admission to Surgery at [facility] on 1/27/25.  6.Disposition and safety  planning ongoing 
1.Pt amenable to continue a trial of Zyprexa Zydis 5 mg po qhs to alleviate pt depression with psychotic features  2.Pt amenable to a trial of Lexapro increased to 15 mg po q am to alleviate the pt's anxious depression  3.Pt  encouraged to attend therapy groups as tolerated  4.Clinical pt collateral history to be obtained from the pt and his family with pt permission (given to hospital staff)  5.Hospitalist H and P and pt surgical follow up after DC home s/p pt SIB via stabbing self in the abdomen prior to this admission to hospital after pt initial admission to Surgery at [facility] on 1/27/25.  6.Disposition and safety  planning ongoing 
1.Pt amenable to continue a trial of Zyprexa Zydis 5 mg po qhs to alleviate pt depression with psychotic features  2.Pt amenable to a trial of Lexapro 5 mg po q am to alleviate the pt's anxious depression  3.Pt  encouraged to attend therapy groups as tolerated  4.Clinical pt collateral history to be obtained from the pt and his family with pt permission (given to hospital staff)  5.Hospitalist H and P and pt surgical follow up after DC home s/p pt SIB via stabbing self in the abdomen prior to this admission to hospital after pt initial admission to Surgery at [facility] on 1/27/25.  6.Disposition and safety  planning ongoing 
1.Pt amenable to continue a trial of Zyprexa Zydis 5 mg po qhs to alleviate pt depression with psychotic features  2.Pt amenable to a trial of Lexapro 5 mg po q am to alleviate the pt's anxious depression  3.Pt  encouraged to attend therapy groups as tolerated  4.Clinical pt collateral history to be obtained from the pt and his family with pt permission (given to hospital staff)  5.Hospitalist H and P and pt surgical follow up after DC home s/p pt SIB via stabbing self in the abdomen prior to this admission to hospital after pt initial admission to Surgery at [facility] on 1/27/25.  6.Disposition and safety  planning ongoing 
1.Pt amenable to continue a trial of Zyprexa Zydis 5 mg po qhs to alleviate pt depression with psychotic features  2.Pt amenable to a trial of Lexapro 5 mg po q am to alleviate the pt's anxious depression  3.Pt  encouraged to attend therapy groups as tolerated  4.Clinical pt collateral history to be obtained from the pt and his family with pt permission (given to hospital staff)  5.Hospitalist H and P and pt surgical follow up after DC home s/p pt SIB via stabbing self in the abdomen prior to this admission to hospital after pt initial admission to Surgery at [facility] on 1/27/25.  6.Disposition and safety  planning ongoing 
1.Pt amenable to continue a trial of Zyprexa Zydis 5 mg po qhs to alleviate pt depression with psychotic features  2.Pt amenable to a trial of Lexapro increased to 15 mg po q am to alleviate the pt's anxious depression  3.Pt  encouraged to attend therapy groups as tolerated  4.Clinical pt collateral history to be obtained from the pt and his family with pt permission (given to hospital staff)  5.Hospitalist H and P and pt surgical follow up after DC home s/p pt SIB via stabbing self in the abdomen prior to this admission to hospital after pt initial admission to Surgery at [facility] on 1/27/25.  6.Disposition and safety  planning ongoing
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
## 2025-02-11 NOTE — BH INPATIENT PSYCHIATRY PROGRESS NOTE - NSICDXBHPRIMARYDX_PSY_ALL_CORE
MDD (major depressive disorder), recurrent, severe, with psychosis   F33.3  

## 2025-02-11 NOTE — BH INPATIENT PSYCHIATRY PROGRESS NOTE - NSTXDEPRESINTERMD_PSY_ALL_CORE
1.Pt amenable to continue a trial of Zyprexa Zydis 5 mg po qhs to alleviate pt depression with psychotic features  2.Pt amenable to a trial of Lexapro 5 mg po q am to alleviate the pt's anxious depression  3.Pt  encouraged to attend therapy groups as tolerated  4.Clinical pt collateral history to be obtained from the pt and his family with pt permission (given to hospital staff)  5.Hospitalist H and P and pt surgical follow up after DC home s/p pt SIB via stabbing self in the abdomen prior to this admission to hospital after pt initial admission to Surgery at [facility] on 1/27/25.  6.Disposition and safety  planning ongoing 
1.Pt amenable to continue a trial of Zyprexa Zydis 5 mg po qhs to alleviate pt depression with psychotic features  2.Pt amenable to a trial of Lexapro 5 mg po q am to alleviate the pt's anxious depression  3.Pt  encouraged to attend therapy groups as tolerated  4.Clinical pt collateral history to be obtained from the pt and his family with pt permission (given to hospital staff)  5.Hospitalist H and P and pt surgical follow up after DC home s/p pt SIB via stabbing self in the abdomen prior to this admission to hospital after pt initial admission to Surgery at [facility] on 1/27/25.  6.Disposition and safety  planning ongoing 
1.Pt amenable to continue a trial of Zyprexa Zydis 5 mg po qhs to alleviate pt depression with psychotic features  2.Pt amenable to a trial of Lexapro 5 mg po q am to alleviate the pt's anxious depression  3.Pt  encouraged to attend therapy groups as tolerated  4.Clinical pt collateral history to be obtained from the pt and his family with pt permission (given to hospital staff)  5.Hospitalist H and P and pt surgical follow up after DC home s/p pt SIB via stabbing self in the abdomen prior to this admission to hospital after pt initial admission to Surgery at [facility] on 1/27/25.  6.Disposition and safety  planning ongoing 
1.Pt amenable to continue a trial of Zyprexa Zydis 5 mg po qhs to alleviate pt depression with psychotic features  2.Pt amenable to a trial of Lexapro 5 mg po q am to alleviate the pt's anxious depression  3.Pt  encouraged to attend therapy groups as tolerated  4.Clinical pt collateral history to be obtained from the pt and his family with pt permission (given to hospital staff)  5.Hospitalist H and P and pt surgical follow up after DC home s/p pt SIB via stabbing self in the abdomen prior to this admission to hospital after pt initial admission to Surgery at [facility] on 1/27/25.  6.Disposition and safety  planning ongoing 
1.Pt amenable to continue a trial of Zyprexa Zydis 5 mg po qhs to alleviate pt depression with psychotic features  2.Pt amenable to a trial of Lexapro 5 mg po q am to alleviate the pt's anxious depression  3.Pt  encouraged to attend therapy groups as tolerated  4.Clinical pt collateral history to be obtained from the pt and his family with pt permission (given to hospital staff)  5.Hospitalist H and P and pt surgical follow up after DC home s/p pt SIB via stabbing self in the abdomen prior to this admission to hospital after pt initial admission to Surgery at [facility] on 1/27/25.  6.Disposition and safety  planning ongoing 
1.Pt amenable to continue a trial of Zyprexa Zydis 5 mg po qhs to alleviate pt depression with psychotic features  2.Pt amenable to a trial of Lexapro increased to 15 mg po q am to alleviate the pt's anxious depression  3.Pt  encouraged to attend therapy groups as tolerated  4.Clinical pt collateral history to be obtained from the pt and his family with pt permission (given to hospital staff)  5.Hospitalist H and P and pt surgical follow up after DC home s/p pt SIB via stabbing self in the abdomen prior to this admission to hospital after pt initial admission to Surgery at [facility] on 1/27/25.  6.Disposition and safety  planning ongoing 
1.Pt amenable to continue a trial of Zyprexa Zydis 5 mg po qhs to alleviate pt depression with psychotic features  2.Pt amenable to a trial of Lexapro increased to 15 mg po q am to alleviate the pt's anxious depression  3.Pt  encouraged to attend therapy groups as tolerated  4.Clinical pt collateral history to be obtained from the pt and his family with pt permission (given to hospital staff)  5.Hospitalist H and P and pt surgical follow up after DC home s/p pt SIB via stabbing self in the abdomen prior to this admission to hospital after pt initial admission to Surgery at [facility] on 1/27/25.  6.Disposition and safety  planning ongoing 
1.Pt amenable to continue a trial of Zyprexa Zydis 5 mg po qhs to alleviate pt depression with psychotic features  2.Pt amenable to a trial of Lexapro 5 mg po q am to alleviate the pt's anxious depression  3.Pt  encouraged to attend therapy groups as tolerated  4.Clinical pt collateral history to be obtained from the pt and his family with pt permission (given to hospital staff)  5.Hospitalist H and P and pt surgical follow up after DC home s/p pt SIB via stabbing self in the abdomen prior to this admission to hospital after pt initial admission to Surgery at [facility] on 1/27/25.  6.Disposition and safety  planning ongoing 
1.Pt amenable to continue a trial of Zyprexa Zydis 5 mg po qhs to alleviate pt depression with psychotic features  2.Pt amenable to a trial of Lexapro 5 mg po q am to alleviate the pt's anxious depression  3.Pt  encouraged to attend therapy groups as tolerated  4.Clinical pt collateral history to be obtained from the pt and his family with pt permission (given to hospital staff)  5.Hospitalist H and P and pt surgical follow up after DC home s/p pt SIB via stabbing self in the abdomen prior to this admission to hospital after pt initial admission to Surgery at [facility] on 1/27/25.  6.Disposition and safety  planning ongoing 
1.Pt amenable to continue a trial of Zyprexa Zydis 5 mg po qhs to alleviate pt depression with psychotic features  2.Pt amenable to a trial of Lexapro increased to 15 mg po q am to alleviate the pt's anxious depression  3.Pt  encouraged to attend therapy groups as tolerated  4.Clinical pt collateral history to be obtained from the pt and his family with pt permission (given to hospital staff)  5.Hospitalist H and P and pt surgical follow up after DC home s/p pt SIB via stabbing self in the abdomen prior to this admission to hospital after pt initial admission to Surgery at [facility] on 1/27/25.  6.Disposition and safety  planning ongoing

## 2025-02-11 NOTE — BH TREATMENT PLAN - NSTXCOPEINTERPR_PSY_ALL_CORE
Pt encouraged to attend 1-2 therapy groups per day to develop and improve coping skills.
Has been attending groups, social and reporting relief from symptoms. Goals to continue. Will be encouraged to attend group programming daily to gain insight and practice coping skills.
Pt scheduled for discharge today. It is encouraged pt utilize learned coping skills in the community.

## 2025-02-11 NOTE — BH INPATIENT PSYCHIATRY DISCHARGE NOTE - NSBHMSEPERCEPT_PSY_A_CORE
Bedside shift report received from previous nurse. Pt has no needs or complaints at this time. No abnormalities
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
## 2025-02-11 NOTE — BH INPATIENT PSYCHIATRY PROGRESS NOTE - OTHER
affect becoming gradually stevens in range and intensity "  I'm feeling good."  more future oriented and hopeful

## 2025-02-11 NOTE — BH TREATMENT PLAN - NSTXPLANTHERAPYSESSIONSFT_PSY_ALL_CORE
02-04-25  Type of therapy: Safety planning  Type of session: Individual  Level of patient participation: Engaged, Participates  Duration of participation: 30 minutes  Therapy conducted by: Psych rehab  Therapy Summary: Met to work on safety planning as patient is reporting improvements and relief from symptoms. Education and support ongoing.    02-06-25  Type of therapy: Mindfulness  Type of session: Group  Level of patient participation: Engaged, Participates  Duration of participation: 45 minutes  Therapy conducted by: Psych rehab  Therapy Summary: Patient attended group therapy and engaged in mindfulness intervention.  
  01-30-25  Type of therapy: N/A  Type of session: N/A  Level of patient participation: Resistance to participation  Duration of participation: 0  Therapy conducted by: Psych rehab  Therapy Summary: Pt declined to attend therapy groups despite encouragement. Remained in his room for most of the day.  
  01-31-25  Type of therapy: Group Therapy   Type of session: Group  Level of patient participation: Engaged  Duration of participation: 45 minutes  Therapy conducted by: Psych rehab  Therapy Summary: Patient attended group therapy. Encouraged to share in group discussion on personal treatment goals and stated he wasn't sure. Has been acclimating and getting more comfortable engaging on the unit and socializing with roommate.    02-02-25  Type of therapy: Mindfulness  Type of session: Group  Level of patient participation: Participates  Duration of participation: 60 minutes  Therapy conducted by: Psych rehab  Therapy Summary: Pt attended therapy group where he participated in stretching and guided meditation. He appeared engaged and interacted with peers.    02-03-25  Type of therapy: Leisure development  Type of session: Group  Level of patient participation: Participates  Duration of participation: 60 minutes  Therapy conducted by: Psych rehab  Therapy Summary: Pt attended therapy groups today. He participated in group games and socialized with peers.    02-04-25  Type of therapy: Safety planning  Type of session: Individual  Level of patient participation: Engaged, Participates  Duration of participation: 30 minutes  Therapy conducted by: Psych rehab  Therapy Summary: Met to work on safety planning as patient is reporting improvements and relief from symptoms. Education and support ongoing.    02-06-25  Type of therapy: Mindfulness  Type of session: Group  Level of patient participation: Engaged, Participates  Duration of participation: 45 minutes  Therapy conducted by: Psych rehab  Therapy Summary: Patient attended group therapy and engaged in mindfulness intervention.

## 2025-02-11 NOTE — BH INPATIENT PSYCHIATRY PROGRESS NOTE - NS ED BHA REVIEW OF ED CHART AVAILABLE LABS REVIEWED
None available
Yes
None available
Yes
None available
Yes
None available

## 2025-02-11 NOTE — BH INPATIENT PSYCHIATRY DISCHARGE NOTE - NSDCRECOMMENDLABFT_PSY_ALL_CORE
CBC with diff, CMP, HgA1C, Fasting lipids q 6 monthly with second generation antipsychotic Zyprexa  Monthly monitoring of vital signs including weight and waist circumference with Zyprexa

## 2025-02-11 NOTE — BH INPATIENT PSYCHIATRY PROGRESS NOTE - NSTXDCOTHRPROGRES_PSY_ALL_CORE
[facility]  [facility]  [address] [facility] [address]  Phone:  [phone]  Fax: [phone]       Chief Complaint   Patient presents with    Alopecia    Cloudy Vision     left eye   . SUBJECTIVE:    Sherif Lake is a 59 y.o. female comes in for a return visit. She states that she had transient cloudiness in her vision of left eye, but this resolved rather rapidly. She is doing well from that perspective. She also is concerned about her alopecia that is forming in the vertex of her head. There is not much that can be offered given the fact that having baldness is not uncommon as individuals age. She has had slight weight loss, so I congratulated her on this. This is important because she has a dysmetabolic status that needs to be addressed with weight loss. She has a past history of diabetes, primary hypertension, and dyslipidemia. She is on medications pertinent to each one of these. Finally, she has officially retired from her job at [employer]. Current Outpatient Medications   Medication Sig Dispense Refill    metFORMIN ER (GLUCOPHAGE XR) 500 mg tablet TAKE 2 TABLETS BY MOUTH TWICE DAILY WITH MEALS 360 Tab 3    valACYclovir (VALTREX) 500 mg tablet TAKE 1 TABLET BY MOUTH EVERY DAY 90 Tab 3    lancets (One Touch Delica) 33 gauge misc Use to test blood sugar once daily. Dx.211.9 100 Lancet 3    glucose blood VI test strips (OneTouch Verio test strips) strip Use to test blood sugar once daily. Dx.e11.9 50 Strip 11    Blood-Glucose Meter (OneTouch Verio Meter) misc Use to test blood sugar once daily. Dx.e11.9 1 Each 0    glucose blood VI test strips (FreeStyle Lite Strips) strip Test twice daily before meals breakfast and dinner 100 Strip 11    clobetasoL (TEMOVATE) 0.05 % ointment Apply  to affected area two (2) times a day.  45 g 4    NYSTOP powder   1    benazepriL (LOTENSIN) 40 mg tablet TAKE 1 TABLET BY
MOUTH EVERY DAY 90 Tab 3     Past Medical History:   Diagnosis Date    Hypertension      Past Surgical History:   Procedure Laterality Date    HX COLONOSCOPY      HX GYN      SALMA BIOPSY BREAST STEREOTACTIC Left 2011    benign     Allergies   Allergen Reactions    Codeine Hives         REVIEW OF SYSTEMS:  General: negative for - chills or fever  ENT: negative for - headaches, nasal congestion or tinnitus  Respiratory: negative for - cough, hemoptysis, shortness of breath or wheezing  Cardiovascular : negative for - chest pain, edema, palpitations or shortness of breath  Gastrointestinal: negative for - abdominal pain, blood in stools, heartburn or nausea/vomiting  Genito-Urinary: no dysuria, trouble voiding, or hematuria  Musculoskeletal: negative for - gait disturbance, joint pain, joint stiffness or joint swelling  Neurological: no TIA or stroke symptoms  Hematologic: no bruises, no bleeding, no swollen glands  Integument: no lumps, mole changes, nail changes or rash  Endocrine: no malaise/lethargy or unexpected weight changes      Social History     Socioeconomic History    Marital status:      Spouse name: Not on file    Number of children: 1    Years of education: 25    Highest education level: Not on file   Occupational History    Occupation: assisstant principal   Tobacco Use    Smoking status: Never Smoker    Smokeless tobacco: Never Used   Substance and Sexual Activity    Alcohol use: No    Drug use: No    Sexual activity: Yes     Partners: Male   Social History Narrative    1.only daughter  from leukemia    2. Son works for [employer]--has MPH from [employer] [address] [place] [address] reviewed. No pertinent family history.     OBJECTIVE:    Visit Vitals  /85   Pulse 94   Temp 98.5 °F (36.9 °C) (Oral)   Resp 16   Ht 5' 6\" (1.676 m)   Wt 201 lb 3.2 oz (91.3 kg)   SpO2 100%   BMI 32.47 kg/m²     CONSTITUTIONAL: well , well nourished, appears age appropriate  EYES: perrla, eom
intact  ENMT:moist mucous membranes, pharynx clear  NECK: supple. Thyroid normal  RESPIRATORY: Chest: clear to ascultation and percussion   CARDIOVASCULAR: Heart: regular rate and rhythm  GASTROINTESTINAL: Abdomen: soft, bowel sounds active  HEMATOLOGIC: no pathological lymph nodes palpated  MUSCULOSKELETAL: Extremities: no edema, pulse 1+   INTEGUMENT: No unusual rashes or suspicious skin lesions noted. Nails appear normal.  NEUROLOGIC: non-focal exam   MENTAL STATUS: alert and oriented, appropriate affect      ASSESSMENT:  1. Essential hypertension    2. Goiter    3. Controlled type 2 diabetes mellitus without complication, without long-term current use of insulin (HCC)    4. Eczema, unspecified type    5. Primary osteoarthritis of right knee    6. Class 2 obesity due to excess calories without serious comorbidity with body mass index (BMI) of 35.0 to 35.9 in adult    7. Hair loss    8. Dyslipidemia    9. Physical exam        PLAN:  1. The patient has high blood pressure, and she is normotensive currently with the use of her antihypertensive medications. This is important because it increases her cardiovascular risk. 2. She does have a nodular goiter previously noted. It is not time for repeat ultrasound just yet. She historically has been euthyroid based on TSH's that have been normal.  3. Her diabetes hopefully is doing well. Interestingly, she decided that she is reducing her metformin because her blood sugars have been normal.  I explained to her that she should continue to take the full doses as prescribed. I will await the results of her hemoglobin A1c.  4. She does have very mild eczema. She has several topical preparations given to her by the dermatologist.  She will continue with these items. 5. She needs to lose additional weight. This can be accomplished by eating meals, eliminating snacks, and avoiding the consumption of processed carbohydrates.   6. She does have hair loss, but this is a
pattern that there is not much that can be done with. She will follow up with her dermatologist.  7. She has an increased cardiovascular risk created by her dysmetabolic status. She is not on a statin currently. I will await the results of her inflammatory marker and her particle count to make a decision about potential use of this agent. Her clinical cardiovascular risk is relatively low. 8. She needs to increase her physical activity, exercising on a more consistent basis. .  Orders Placed This Encounter    HEMOGLOBIN A1C WITH EAG    MICROALBUMIN, UR, RAND W/ MICROALB/CREAT RATIO    APOLIPOPROTEIN B    CBC WITH AUTOMATED DIFF    CRP, HIGH SENSITIVITY    LIPID PANEL    METABOLIC PANEL, COMPREHENSIVE    TSH 3RD GENERATION    URINALYSIS W/ RFLX MICROSCOPIC         Follow-up and Dispositions    · Return in about 3 months (around 7/14/2021).            Linda Suresh MD
Met - goal discontinued
No Change
No Change
Improving
No Change
Improving
No Change
Improving
Improving

## 2025-02-11 NOTE — BH TREATMENT PLAN - NSTXCAREGIVERPARTICIPATE_PSY_P_CORE
Family/Caregiver participated in identification of needs/problems/goals for treatment
Family/Caregiver participated in identification of needs/problems/goals for treatment/Family/Caregiver participated in defining interventions/Family/Caregiver participated in development of after care plan
Family/Caregiver participated in identification of needs/problems/goals for treatment/Family/Caregiver participated in defining interventions

## 2025-02-11 NOTE — BH INPATIENT PSYCHIATRY PROGRESS NOTE - PRN MEDS
MEDICATIONS  (PRN):  acetaminophen     Tablet .. 650 milliGRAM(s) Oral every 6 hours PRN Temp greater or equal to 38C (100.4F), Mild Pain (1 - 3), Moderate Pain (4 - 6)  aluminum hydroxide/magnesium hydroxide/simethicone Suspension 30 milliLiter(s) Oral every 6 hours PRN Dyspepsia  bacitracin   Ointment 1 Application(s) Topical two times a day PRN to wound  ibuprofen  Tablet. 600 milliGRAM(s) Oral every 6 hours PRN Moderate Pain (4 - 6), Severe Pain (7 - 10)  magnesium hydroxide Suspension 30 milliLiter(s) Oral daily PRN Constipation  
MEDICATIONS  (PRN):  acetaminophen     Tablet .. 650 milliGRAM(s) Oral every 6 hours PRN Temp greater or equal to 38C (100.4F), Mild Pain (1 - 3), Moderate Pain (4 - 6)  aluminum hydroxide/magnesium hydroxide/simethicone Suspension 30 milliLiter(s) Oral every 6 hours PRN Dyspepsia  ibuprofen  Tablet. 600 milliGRAM(s) Oral every 6 hours PRN Moderate Pain (4 - 6), Severe Pain (7 - 10)  LORazepam     Tablet 1 milliGRAM(s) Oral every 6 hours PRN mild to moderate anxiety/agitation due to mood d/o  magnesium hydroxide Suspension 30 milliLiter(s) Oral daily PRN Constipation  
MEDICATIONS  (PRN):  acetaminophen     Tablet .. 650 milliGRAM(s) Oral every 6 hours PRN Temp greater or equal to 38C (100.4F), Mild Pain (1 - 3), Moderate Pain (4 - 6)  aluminum hydroxide/magnesium hydroxide/simethicone Suspension 30 milliLiter(s) Oral every 6 hours PRN Dyspepsia  bacitracin   Ointment 1 Application(s) Topical two times a day PRN to wound  ibuprofen  Tablet. 600 milliGRAM(s) Oral every 6 hours PRN Moderate Pain (4 - 6), Severe Pain (7 - 10)  magnesium hydroxide Suspension 30 milliLiter(s) Oral daily PRN Constipation  
MEDICATIONS  (PRN):  acetaminophen     Tablet .. 650 milliGRAM(s) Oral every 6 hours PRN Temp greater or equal to 38C (100.4F), Mild Pain (1 - 3), Moderate Pain (4 - 6)  aluminum hydroxide/magnesium hydroxide/simethicone Suspension 30 milliLiter(s) Oral every 6 hours PRN Dyspepsia  bacitracin   Ointment 1 Application(s) Topical two times a day PRN to wound  ibuprofen  Tablet. 600 milliGRAM(s) Oral every 6 hours PRN Moderate Pain (4 - 6), Severe Pain (7 - 10)  magnesium hydroxide Suspension 30 milliLiter(s) Oral daily PRN Constipation  
MEDICATIONS  (PRN):  acetaminophen     Tablet .. 650 milliGRAM(s) Oral every 6 hours PRN Temp greater or equal to 38C (100.4F), Mild Pain (1 - 3), Moderate Pain (4 - 6)  aluminum hydroxide/magnesium hydroxide/simethicone Suspension 30 milliLiter(s) Oral every 6 hours PRN Dyspepsia  bacitracin   Ointment 1 Application(s) Topical two times a day PRN to wound  ibuprofen  Tablet. 600 milliGRAM(s) Oral every 6 hours PRN Moderate Pain (4 - 6), Severe Pain (7 - 10)  LORazepam     Tablet 1 milliGRAM(s) Oral every 6 hours PRN mild to moderate anxiety/agitation due to mood d/o  magnesium hydroxide Suspension 30 milliLiter(s) Oral daily PRN Constipation  
MEDICATIONS  (PRN):  acetaminophen     Tablet .. 650 milliGRAM(s) Oral every 6 hours PRN Temp greater or equal to 38C (100.4F), Mild Pain (1 - 3), Moderate Pain (4 - 6)  aluminum hydroxide/magnesium hydroxide/simethicone Suspension 30 milliLiter(s) Oral every 6 hours PRN Dyspepsia  bacitracin   Ointment 1 Application(s) Topical two times a day PRN to wound  ibuprofen  Tablet. 600 milliGRAM(s) Oral every 6 hours PRN Moderate Pain (4 - 6), Severe Pain (7 - 10)  LORazepam     Tablet 1 milliGRAM(s) Oral every 6 hours PRN mild to moderate anxiety/agitation due to mood d/o  magnesium hydroxide Suspension 30 milliLiter(s) Oral daily PRN Constipation  
MEDICATIONS  (PRN):  acetaminophen     Tablet .. 650 milliGRAM(s) Oral every 6 hours PRN Temp greater or equal to 38C (100.4F), Mild Pain (1 - 3), Moderate Pain (4 - 6)  aluminum hydroxide/magnesium hydroxide/simethicone Suspension 30 milliLiter(s) Oral every 6 hours PRN Dyspepsia  ibuprofen  Tablet. 600 milliGRAM(s) Oral every 6 hours PRN Moderate Pain (4 - 6), Severe Pain (7 - 10)  LORazepam     Tablet 1 milliGRAM(s) Oral every 6 hours PRN mild to moderate anxiety/agitation due to mood d/o  magnesium hydroxide Suspension 30 milliLiter(s) Oral daily PRN Constipation  
MEDICATIONS  (PRN):  acetaminophen     Tablet .. 650 milliGRAM(s) Oral every 6 hours PRN Temp greater or equal to 38C (100.4F), Mild Pain (1 - 3), Moderate Pain (4 - 6)  aluminum hydroxide/magnesium hydroxide/simethicone Suspension 30 milliLiter(s) Oral every 6 hours PRN Dyspepsia  bacitracin   Ointment 1 Application(s) Topical two times a day PRN to wound  ibuprofen  Tablet. 600 milliGRAM(s) Oral every 6 hours PRN Moderate Pain (4 - 6), Severe Pain (7 - 10)  LORazepam     Tablet 1 milliGRAM(s) Oral every 6 hours PRN mild to moderate anxiety/agitation due to mood d/o  magnesium hydroxide Suspension 30 milliLiter(s) Oral daily PRN Constipation  
MEDICATIONS  (PRN):  acetaminophen     Tablet .. 650 milliGRAM(s) Oral every 6 hours PRN Temp greater or equal to 38C (100.4F), Mild Pain (1 - 3), Moderate Pain (4 - 6)  aluminum hydroxide/magnesium hydroxide/simethicone Suspension 30 milliLiter(s) Oral every 6 hours PRN Dyspepsia  bacitracin   Ointment 1 Application(s) Topical two times a day PRN to wound  ibuprofen  Tablet. 600 milliGRAM(s) Oral every 6 hours PRN Moderate Pain (4 - 6), Severe Pain (7 - 10)  magnesium hydroxide Suspension 30 milliLiter(s) Oral daily PRN Constipation
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
## 2025-02-11 NOTE — BH INPATIENT PSYCHIATRY PROGRESS NOTE - NSTXCOPEDATETRGT_PSY_ALL_CORE
05-Feb-2025
11-Feb-2025
11-Feb-2025
05-Feb-2025
11-Feb-2025
05-Feb-2025
11-Feb-2025
05-Feb-2025
05-Feb-2025
11-Feb-2025
11-Feb-2025

## 2025-02-11 NOTE — BH TREATMENT PLAN - NSBHPRIMARYDX_PSY_ALL_CORE
MDD (major depressive disorder), recurrent, severe, with psychosis    

## 2025-02-11 NOTE — BH INPATIENT PSYCHIATRY DISCHARGE NOTE - NSBHDCHANDOFFFT_PSY_ALL_CORE
Clinical handoff was completed by hospital staff and the pt's medications were e-prescribed to the [facility] in [facility] x a 2 week supply  with 1 RF. The pt has an intake appointment at [facility] in [place] on Thursday 2/13/25 at 11:15 am with therapist Amanuel and  Fide for  referral for ongoing individual therapy and med management. The pt and his family are aware that they can contact Dr Bustamante at [facility] at tel [phone] with any further questions or concerns.

## 2025-02-11 NOTE — BH INPATIENT PSYCHIATRY PROGRESS NOTE - NSBHCHARTREVIEWINVESTIGATE_PSY_A_CORE FT
Ventricular Rate 68 BPM    Atrial Rate 68 BPM    P-R Interval 116 ms    QRS Duration 128 ms    Q-T Interval 390 ms    QTC Calculation(Bezet) 414 ms    P Axis 31 degrees    R Axis -17 degrees    T Axis 58 degrees    Diagnosis Line Normal sinus rhythm  Non-specific intra-ventricular conduction block  Nonspecific T wave abnormality    Confirmed by MAHESH MERINO ([id number]) on 1/8/2019 8:08:58 AM  

## 2025-02-11 NOTE — BH INPATIENT PSYCHIATRY DISCHARGE NOTE - NSDCMRMEDTOKEN_GEN_ALL_CORE_FT
escitalopram 20 mg oral tablet: 1 tab(s) orally once a day  melatonin 5 mg oral tablet: 1 tab(s) orally once a day (at bedtime)  Multiple Vitamins with Minerals oral tablet: 1 tab(s) orally once a day  OLANZapine 7.5 mg oral tablet: 1 tab(s) orally once a day (at bedtime)

## 2025-02-11 NOTE — BH INPATIENT PSYCHIATRY PROGRESS NOTE - NSTXDCOTHRGOAL_PSY_ALL_CORE
Patient will have social work assessment of clinical and social needs to be addressed in discharge planning.Patient will have safe placement upon d/c from  and he will have an appointment for continuing in the appropriate level of psychiatric o/p treatment within 5 business days of d/c from , JAILENE if appropriate and Medical F/U as appropriate. Patient will also have resources to address any SDOH needs identified during this admission.
Patient will have social work assessment of clinical and social needs to be addressed in discharge planning.Patient will have safe placement upon d/c from  and he will have an appointment for continuing in the appropriate level of psychiatric o/p treatment within 5 business days of d/c from , JAILENE if appropriate and Medical F/U as appropriate. Patient will also have resources to address any SDOH needs identified during this admission.
Chelint will have social work assessment of clinical and social needs to be addressed in discharge planning.  Patient will have safe placement upon d/c from  and he will have an appointment for continuing in the appropriate level of psychiatric o/p treatment within 5 business days of d/c from , JAILENE if appropriate and Medical F/U as appropriate. Patient will also have resources to address any SDOH needs identified during this admission.
Patient will have social work assessment of clinical and social needs to be addressed in discharge planning.Patient will have safe placement upon d/c from  and he will have an appointment for continuing in the appropriate level of psychiatric o/p treatment within 5 business days of d/c from , JAILENE if appropriate and Medical F/U as appropriate. Patient will also have resources to address any SDOH needs identified during this admission.

## 2025-02-11 NOTE — BH TREATMENT PLAN - NSTXSUICIDINTERRN_PSY_ALL_CORE
Nurse will educate pt on the need to continue medication regimen.    Nurse will encourage pt to attend group therapy sessions.

## 2025-02-11 NOTE — BH INPATIENT PSYCHIATRY PROGRESS NOTE - NSTXCOPEDATEEST_PSY_ALL_CORE
29-Jan-2025
04-Feb-2025
29-Jan-2025
04-Feb-2025
29-Jan-2025
04-Feb-2025
11-Feb-2025
04-Feb-2025
04-Feb-2025

## 2025-02-11 NOTE — BH INPATIENT PSYCHIATRY PROGRESS NOTE - NSTXDEPRESDATETRGT_PSY_ALL_CORE
06-Feb-2025
13-Feb-2025
13-Feb-2025
06-Feb-2025
06-Feb-2025
13-Feb-2025
06-Feb-2025
06-Feb-2025
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
## 2025-02-11 NOTE — BH INPATIENT PSYCHIATRY PROGRESS NOTE - NSBHMETABOLIC_PSY_ALL_CORE_FT
BMI: BMI (kg/m2): 22.1 (01-29-25 @ 13:16)  HbA1c: A1C with Estimated Average Glucose Result: 4.9 % (01-30-25 @ 07:29)    Glucose:   BP: --Vital Signs Last 24 Hrs  T(C): 36.5 (02-04-25 @ 07:20), Max: 36.5 (02-04-25 @ 07:20)  T(F): 97.7 (02-04-25 @ 07:20), Max: 97.7 (02-04-25 @ 07:20)  HR: --  BP: --  BP(mean): --  RR: 16 (02-04-25 @ 07:20) (16 - 16)  SpO2: 100% (02-04-25 @ 07:20) (100% - 100%)    Orthostatic VS  02-04-25 @ 07:20  Lying BP: --/-- HR: --  Sitting BP: 151/90 HR: 87  Standing BP: 144/92 HR: 100  Site: upper right arm  Mode: electronic  Orthostatic VS  02-03-25 @ 07:55  Lying BP: --/-- HR: --  Sitting BP: 135/70 HR: 87  Standing BP: 132/79 HR: 100  Site: --  Mode: electronic  Orthostatic VS  02-03-25 @ 07:17  Lying BP: --/-- HR: --  Sitting BP: 144/91 HR: 73  Standing BP: 130/96 HR: 96  Site: upper right arm  Mode: electronic    Lipid Panel: Date/Time: 01-30-25 @ 07:29  Cholesterol, Serum: 142  LDL Cholesterol Calculated: 79  HDL Cholesterol, Serum: 38  Total Cholesterol/HDL Ration Measurement: --  Triglycerides, Serum: 141  
BMI: BMI (kg/m2): 22.1 (01-29-25 @ 13:16)  HbA1c: A1C with Estimated Average Glucose Result: 4.9 % (01-30-25 @ 07:29)    Glucose:   BP: --Vital Signs Last 24 Hrs  T(C): 36.3 (02-07-25 @ 07:00), Max: 36.3 (02-07-25 @ 07:00)  T(F): 97.3 (02-07-25 @ 07:00), Max: 97.3 (02-07-25 @ 07:00)  HR: --  BP: --  BP(mean): --  RR: 19 (02-07-25 @ 07:00) (19 - 19)  SpO2: 100% (02-07-25 @ 07:00) (100% - 100%)    Orthostatic VS  02-07-25 @ 07:00  Lying BP: --/-- HR: --  Sitting BP: 119/88 HR: 77  Standing BP: 130/92 HR: 94  Site: upper right arm  Mode: electronic  Orthostatic VS  02-06-25 @ 08:22  Lying BP: --/-- HR: --  Sitting BP: 117/85 HR: 76  Standing BP: 114/73 HR: 95  Site: upper right arm  Mode: electronic    Lipid Panel: Date/Time: 01-30-25 @ 07:29  Cholesterol, Serum: 142  LDL Cholesterol Calculated: 79  HDL Cholesterol, Serum: 38  Total Cholesterol/HDL Ration Measurement: --  Triglycerides, Serum: 141  
BMI: BMI (kg/m2): 22.1 (01-29-25 @ 13:16)  HbA1c: A1C with Estimated Average Glucose Result: 4.9 % (01-30-25 @ 07:29)    Glucose:   BP: --Vital Signs Last 24 Hrs  T(C): 36.4 (02-11-25 @ 07:36), Max: 36.4 (02-11-25 @ 07:36)  T(F): 97.5 (02-11-25 @ 07:36), Max: 97.5 (02-11-25 @ 07:36)  HR: --  BP: --  BP(mean): --  RR: 16 (02-11-25 @ 07:36) (16 - 16)  SpO2: 100% (02-11-25 @ 07:36) (100% - 100%)    Orthostatic VS  02-11-25 @ 07:36  Lying BP: --/-- HR: --  Sitting BP: 129/92 HR: 86  Standing BP: 130/91 HR: 93  Site: upper right arm  Mode: electronic  Orthostatic VS  02-10-25 @ 07:22  Lying BP: --/-- HR: --  Sitting BP: 145/87 HR: 78  Standing BP: 137/88 HR: 88  Site: upper right arm  Mode: electronic    Lipid Panel: Date/Time: 01-30-25 @ 07:29  Cholesterol, Serum: 142  LDL Cholesterol Calculated: 79  HDL Cholesterol, Serum: 38  Total Cholesterol/HDL Ration Measurement: --  Triglycerides, Serum: 141  
BMI: BMI (kg/m2): 22.1 (01-29-25 @ 13:16)  HbA1c: A1C with Estimated Average Glucose Result: 4.9 % (01-30-25 @ 07:29)    Glucose:   BP: --Vital Signs Last 24 Hrs  T(C): 36.6 (01-30-25 @ 07:34), Max: 36.6 (01-30-25 @ 07:34)  T(F): 97.9 (01-30-25 @ 07:34), Max: 97.9 (01-30-25 @ 07:34)  HR: --  BP: --  BP(mean): --  RR: 18 (01-30-25 @ 07:34) (18 - 18)  SpO2: 100% (01-30-25 @ 07:34) (100% - 100%)    Orthostatic VS  01-30-25 @ 07:34  Lying BP: --/-- HR: --  Sitting BP: 134/80 HR: 97  Standing BP: 112/76 HR: 112  Site: upper right arm  Mode: electronic  Orthostatic VS  01-29-25 @ 13:16  Lying BP: --/-- HR: --  Sitting BP: 118/90 HR: 111  Standing BP: 113/78 HR: 136  Site: --  Mode: --    Lipid Panel: Date/Time: 01-30-25 @ 07:29  Cholesterol, Serum: 142  LDL Cholesterol Calculated: 79  HDL Cholesterol, Serum: 38  Total Cholesterol/HDL Ration Measurement: --  Triglycerides, Serum: 141  
BMI: BMI (kg/m2): 22.1 (01-29-25 @ 13:16)  HbA1c: A1C with Estimated Average Glucose Result: 4.9 % (01-30-25 @ 07:29)    Glucose:   BP: 126/80 (01-30-25 @ 20:10) (126/80 - 126/80)Vital Signs Last 24 Hrs  T(C): 36.4 (02-01-25 @ 07:45), Max: 36.4 (02-01-25 @ 07:45)  T(F): 97.5 (02-01-25 @ 07:45), Max: 97.5 (02-01-25 @ 07:45)  HR: --  BP: --  BP(mean): --  RR: 18 (02-01-25 @ 07:45) (18 - 18)  SpO2: 100% (02-01-25 @ 07:45) (100% - 100%)    Orthostatic VS  02-01-25 @ 07:45  Lying BP: --/-- HR: --  Sitting BP: 130/90 HR: 100  Standing BP: 122/76 HR: 102  Site: upper right arm  Mode: electronic  Orthostatic VS  01-31-25 @ 07:05  Lying BP: --/-- HR: --  Sitting BP: 148/85 HR: 77  Standing BP: 134/90 HR: 91  Site: upper right arm  Mode: electronic  Orthostatic VS  01-30-25 @ 20:10  Lying BP: --/-- HR: --  Sitting BP: --/-- HR: --  Standing BP: --/-- HR: --  Site: upper right arm  Mode: electronic    Lipid Panel: Date/Time: 01-30-25 @ 07:29  Cholesterol, Serum: 142  LDL Cholesterol Calculated: 79  HDL Cholesterol, Serum: 38  Total Cholesterol/HDL Ration Measurement: --  Triglycerides, Serum: 141  
BMI: BMI (kg/m2): 22.1 (01-29-25 @ 13:16)  HbA1c: A1C with Estimated Average Glucose Result: 4.9 % (01-30-25 @ 07:29)    Glucose:   BP: --Vital Signs Last 24 Hrs  T(C): 36.2 (02-03-25 @ 07:17), Max: 36.2 (02-03-25 @ 07:17)  T(F): 97.2 (02-03-25 @ 07:17), Max: 97.2 (02-03-25 @ 07:17)  HR: --  BP: --  BP(mean): --  RR: 16 (02-03-25 @ 07:55) (16 - 16)  SpO2: 100% (02-03-25 @ 07:17) (100% - 100%)    Orthostatic VS  02-03-25 @ 07:55  Lying BP: --/-- HR: --  Sitting BP: 135/70 HR: 87  Standing BP: 132/79 HR: 100  Site: --  Mode: electronic  Orthostatic VS  02-03-25 @ 07:17  Lying BP: --/-- HR: --  Sitting BP: 144/91 HR: 73  Standing BP: 130/96 HR: 96  Site: upper right arm  Mode: electronic  Orthostatic VS  02-02-25 @ 07:53  Lying BP: --/-- HR: --  Sitting BP: 141/92 HR: 79  Standing BP: 145/96 HR: 95  Site: upper right arm  Mode: electronic    Lipid Panel: Date/Time: 01-30-25 @ 07:29  Cholesterol, Serum: 142  LDL Cholesterol Calculated: 79  HDL Cholesterol, Serum: 38  Total Cholesterol/HDL Ration Measurement: --  Triglycerides, Serum: 141  
BMI: BMI (kg/m2): 22.1 (01-29-25 @ 13:16)  HbA1c: A1C with Estimated Average Glucose Result: 4.9 % (01-30-25 @ 07:29)    Glucose:   BP: 126/80 (01-30-25 @ 20:10) (126/80 - 126/80)Vital Signs Last 24 Hrs  T(C): 36.3 (02-02-25 @ 07:53), Max: 36.3 (02-02-25 @ 07:53)  T(F): 97.4 (02-02-25 @ 07:53), Max: 97.4 (02-02-25 @ 07:53)  HR: --  BP: --  BP(mean): --  RR: 18 (02-02-25 @ 07:53) (18 - 18)  SpO2: 100% (02-02-25 @ 07:53) (100% - 100%)    Orthostatic VS  02-02-25 @ 07:53  Lying BP: --/-- HR: --  Sitting BP: 141/92 HR: 79  Standing BP: 145/96 HR: 95  Site: upper right arm  Mode: electronic  Orthostatic VS  02-01-25 @ 07:45  Lying BP: --/-- HR: --  Sitting BP: 130/90 HR: 100  Standing BP: 122/76 HR: 102  Site: upper right arm  Mode: electronic    Lipid Panel: Date/Time: 01-30-25 @ 07:29  Cholesterol, Serum: 142  LDL Cholesterol Calculated: 79  HDL Cholesterol, Serum: 38  Total Cholesterol/HDL Ration Measurement: --  Triglycerides, Serum: 141  
BMI: BMI (kg/m2): 22.1 (01-29-25 @ 13:16)  HbA1c: A1C with Estimated Average Glucose Result: 4.9 % (01-30-25 @ 07:29)    Glucose:   BP: 126/80 (01-30-25 @ 20:10) (126/80 - 126/80)Vital Signs Last 24 Hrs  T(C): 36.5 (01-31-25 @ 07:05), Max: 36.6 (01-30-25 @ 20:10)  T(F): 97.7 (01-31-25 @ 07:05), Max: 97.9 (01-30-25 @ 20:10)  HR: 91 (01-30-25 @ 20:10) (91 - 91)  BP: 126/80 (01-30-25 @ 20:10) (126/80 - 126/80)  BP(mean): --  RR: 18 (01-31-25 @ 07:05) (18 - 18)  SpO2: 100% (01-31-25 @ 07:05) (96% - 100%)    Orthostatic VS  01-31-25 @ 07:05  Lying BP: --/-- HR: --  Sitting BP: 148/85 HR: 77  Standing BP: 134/90 HR: 91  Site: upper right arm  Mode: electronic  Orthostatic VS  01-30-25 @ 20:10  Lying BP: --/-- HR: --  Sitting BP: --/-- HR: --  Standing BP: --/-- HR: --  Site: upper right arm  Mode: electronic  Orthostatic VS  01-30-25 @ 07:34  Lying BP: --/-- HR: --  Sitting BP: 134/80 HR: 97  Standing BP: 112/76 HR: 112  Site: upper right arm  Mode: electronic    Lipid Panel: Date/Time: 01-30-25 @ 07:29  Cholesterol, Serum: 142  LDL Cholesterol Calculated: 79  HDL Cholesterol, Serum: 38  Total Cholesterol/HDL Ration Measurement: --  Triglycerides, Serum: 141  
BMI: BMI (kg/m2): 22.1 (01-29-25 @ 13:16)  HbA1c: A1C with Estimated Average Glucose Result: 4.9 % (01-30-25 @ 07:29)    Glucose:   BP: --Vital Signs Last 24 Hrs  T(C): 36.5 (02-06-25 @ 08:22), Max: 36.5 (02-06-25 @ 08:22)  T(F): 97.7 (02-06-25 @ 08:22), Max: 97.7 (02-06-25 @ 08:22)  HR: --  BP: --  BP(mean): --  RR: 18 (02-06-25 @ 08:22) (18 - 18)  SpO2: 99% (02-06-25 @ 08:22) (99% - 99%)    Orthostatic VS  02-06-25 @ 08:22  Lying BP: --/-- HR: --  Sitting BP: 117/85 HR: 76  Standing BP: 114/73 HR: 95  Site: upper right arm  Mode: electronic  Orthostatic VS  02-05-25 @ 08:28  Lying BP: --/-- HR: --  Sitting BP: 130/78 HR: 76  Standing BP: 121/84 HR: 98  Site: upper right arm  Mode: electronic    Lipid Panel: Date/Time: 01-30-25 @ 07:29  Cholesterol, Serum: 142  LDL Cholesterol Calculated: 79  HDL Cholesterol, Serum: 38  Total Cholesterol/HDL Ration Measurement: --  Triglycerides, Serum: 141  
BMI: BMI (kg/m2): 22.1 (01-29-25 @ 13:16)  HbA1c: A1C with Estimated Average Glucose Result: 4.9 % (01-30-25 @ 07:29)    Glucose:   BP: --Vital Signs Last 24 Hrs  T(C): 36.3 (02-05-25 @ 08:28), Max: 36.3 (02-05-25 @ 08:28)  T(F): 97.4 (02-05-25 @ 08:28), Max: 97.4 (02-05-25 @ 08:28)  HR: --  BP: --  BP(mean): --  RR: 18 (02-05-25 @ 08:28) (18 - 18)  SpO2: 100% (02-05-25 @ 08:28) (100% - 100%)    Orthostatic VS  02-05-25 @ 08:28  Lying BP: --/-- HR: --  Sitting BP: 130/78 HR: 76  Standing BP: 121/84 HR: 98  Site: upper right arm  Mode: electronic  Orthostatic VS  02-04-25 @ 07:20  Lying BP: --/-- HR: --  Sitting BP: 151/90 HR: 87  Standing BP: 144/92 HR: 100  Site: upper right arm  Mode: electronic    Lipid Panel: Date/Time: 01-30-25 @ 07:29  Cholesterol, Serum: 142  LDL Cholesterol Calculated: 79  HDL Cholesterol, Serum: 38  Total Cholesterol/HDL Ration Measurement: --  Triglycerides, Serum: 141  
BMI: BMI (kg/m2): 22.1 (01-29-25 @ 13:16)  HbA1c: A1C with Estimated Average Glucose Result: 4.9 % (01-30-25 @ 07:29)    Glucose:   BP: --Vital Signs Last 24 Hrs  T(C): 36.4 (02-10-25 @ 07:22), Max: 36.4 (02-10-25 @ 07:22)  T(F): 97.5 (02-10-25 @ 07:22), Max: 97.5 (02-10-25 @ 07:22)  HR: --  BP: --  BP(mean): --  RR: 17 (02-10-25 @ 07:22) (17 - 17)  SpO2: 100% (02-10-25 @ 07:22) (100% - 100%)    Orthostatic VS  02-10-25 @ 07:22  Lying BP: --/-- HR: --  Sitting BP: 145/87 HR: 78  Standing BP: 137/88 HR: 88  Site: upper right arm  Mode: electronic  Orthostatic VS  02-09-25 @ 07:22  Lying BP: --/-- HR: --  Sitting BP: 132/91 HR: 75  Standing BP: 127/80 HR: 92  Site: upper right arm  Mode: electronic    Lipid Panel: Date/Time: 01-30-25 @ 07:29  Cholesterol, Serum: 142  LDL Cholesterol Calculated: 79  HDL Cholesterol, Serum: 38  Total Cholesterol/HDL Ration Measurement: --  Triglycerides, Serum: 141

## 2025-02-11 NOTE — BH INPATIENT PSYCHIATRY PROGRESS NOTE - NSTXDCOTHRDATETRGT_PSY_ALL_CORE
11-Feb-2025
07-Feb-2025
07-Feb-2025
11-Feb-2025
07-Feb-2025
11-Feb-2025
07-Feb-2025
11-Feb-2025
06-Feb-2025
07-Feb-2025
11-Feb-2025

## 2025-02-11 NOTE — BH INPATIENT PSYCHIATRY PROGRESS NOTE - NSBHMSEMOOD_PSY_A_CORE
Depressed/Anxious/Other
Other
Depressed/Anxious
Depressed/Anxious/Other
Depressed/Anxious/Other
Depressed/Anxious
Depressed/Anxious/Other

## 2025-02-11 NOTE — BH INPATIENT PSYCHIATRY PROGRESS NOTE - NSBHASSESSSUMMFT_PSY_ALL_CORE
29 yr-old WM with major depression with psychotic features. Pt with no significant PMH but suicide attempt with pt   s/p self inflicted stab wound to the epigastrium with wound hematoma evacuation at [place] . Sutures to be removed 2/13/25. Pt begun on Zyprexa now increased to 7.5 mg po qhs and Lexapro increased to 10 mg po q am. Pt still depressed and likely thought disordered but not actively suicidal currently.  First admission. Strong family support  Patient risk is mitigated to low at this time.  Patient is responding well to stage environment, meds management and safety planning.      2/4/25 Pt seen and discussed with treatment team. The pt has been more visible on the unit and more easily engaged in conversation with peers and staff and more meaningfully participating in therapy groups. The pt has begun to be more introspective in general and he has begun to broaden his perspective as to events leading up to his suicide attempt and subsequent admission initially to surgery and then to inpatient psychiatry for ongoing treatment of his worsening depression with psychosis.  The pt   On 2/4/25 this writer filed a mandatory SAFE ACT report  and then called the pt's mother , an RN who was familiar with the SAFE ACT mandate.   The pt's mother reported that she and her  had removed a gun used by the pt and his cousin at a local target range from the home which they share with the pt when the pt was admitted to [facility] the week of 1/27/25 . The pt reported good sleep and appetite and he continues more future oriented as to outpatient the pt's preference for  in person therapy and med management and for pt future occupational and travel plans.  The pt continues to tolerate his current med regimen of Zyprexa 7.5 mg po qhs and Lexapro 10 mg po q am with plan to further titrate the pt's med regimen as clinically indicated.    The pt continues to deny any current active SI /HI/ AH /VH and he continues to present currently as a low risk for either suicide or for impulsive aggression.   2/5/25 Pt seen and discussed with treatment team. The pt noted that in retrospect that he thinks he was hearing  possible voices prior to his having made this serious suicide attempt via stabbing himself in the abdomen with a knife. The pt's wound s/p hematoma evacuation and stables per surgery while at [place] remain clean, dry and intact with daily Bacitracin ointment applied and with no pt report of pain. This writer spoke with the pt's mother and with the pt as to plan to consult the surgical PA in house to assess the pt's wound and the readiness to have the staples removed prior to pt DC home when clinically stable. The pt spoke of some daytime fatigue and he denied any further recurrence of SI /HI /AH /VH . Plan discussed with the pt to lower the pt's hs Zyprexa dose from 7.5 mg to 5 mg po qhs and to increase the pt's Lexapro dose from 10 mg to 15 mg po q am to further alleviate the pt's long standing depression.  The pt has been eating well and he has been attending therapy groups and actively participating in the treatment offered.   2/6/25 Pt seen and discussed with treatment team. The pt was visible on the unit and participated in therapy groups. The pt reported good sleep and appetite and he continues to tolerate his current med regimen of Zyprexa 5 mg po qhs and Lexapro now titrated to 15 mg po q am. The pt denies any current SI /HI/AH and he continues to express more optimistic future oriented thinking.  The pt's abdominal staples/sutures were removed w/o incident by the surgical PA. 
29 yr-old WM with major depression with psychotic features. Pt with no significant PMH but suicide attempt with pt   s/p self inflicted stab wound to the epigastrium with wound hematoma evacuation at [place] . Sutures to be removed 2/13/25. Pt begun on Zyprexa now increased to 7.5 mg po qhs and Lexapro increased to 10 mg po q am. Pt still depressed and likely thought disordered but not actively suicidal currently.  First admission. Strong family support  Patient risk is mitigated to low at this time.  Patient is responding well to stage environment, meds management and safety planning.    2/4/25 Pt seen and discussed with treatment team. The pt has been more visible on the unit and more easily engaged in conversation with peers and staff and more meaningfully participating in therapy groups. The pt has begun to be more introspective in general and he has begun to broaden his perspective as to events leading up to his suicide attempt and subsequent admission initially to surgery and then to inpatient psychiatry for ongoing treatment of his worsening depression with psychosis.  The pt   On 2/4/25 this writer filed a mandatory SAFE ACT report  and then called the pt's mother , an RN who was familiar with the SAFE ACT mandate.   The pt's mother reported that she and her  had removed a gun used by the pt and his cousin at a local target range from the home which they share with the pt when the pt was admitted to [facility] the week of 1/27/25 . The pt reported good sleep and appetite and he continues more future oriented as to outpatient the pt's preference for  in person therapy and med management and for pt future occupational and travel plans.  The pt continues to tolerate his current med regimen of Zyprexa 7.5 mg po qhs and Lexapro 10 mg po q am with plan to further titrate the pt's med regimen as clinically indicated.    The pt continues to deny any current active SI /HI/ AH /VH and he continues to present currently as a low risk for either suicide or for impulsive aggression.   2/5/25 Pt seen and discussed with treatment team. The pt noted that in retrospect that he thinks he was hearing  possible voices prior to his having made this serious suicide attempt via stabbing himself in the abdomen with a knife. The pt's wound s/p hematoma evacuation and stables per surgery while at [place] remain clean, dry and intact with daily Bacitracin ointment applied and with no pt report of pain. This writer spoke with the pt's mother and with the pt as to plan to consult the surgical PA in house to assess the pt's wound and the readiness to have the staples removed prior to pt DC home when clinically stable. The pt spoke of some daytime fatigue and he denied any further recurrence of SI /HI /AH /VH . Plan discussed with the pt to lower the pt's hs Zyprexa dose from 7.5 mg to 5 mg po qhs and to increase the pt's Lexapro dose from 10 mg to 15 mg po q am to further alleviate the pt's long standing depression.  The pt has been eating well and he has been attending therapy groups and actively participating in the treatment offered.   2/6/25 Pt seen and discussed with treatment team. The pt was visible on the unit and participated in therapy groups. The pt reported good sleep and appetite and he continues to tolerate his current med regimen of Zyprexa 5 mg po qhs and Lexapro now titrated to 15 mg po q am. The pt denies any current SI /HI/AH and he continues to express more optimistic future oriented thinking.  The pt's abdominal staples/sutures were removed w/o incident by the surgical PA.   2/7/25 Pt seen in the day room and discussed with treatment team. The pt appeared calm but still somewhat dysphoric with constricted affect and he appeared tired after not having slept well the past 2 nights after the pt's Zyprexa dose was lowered at his request due to c/o feeling 'foggy and sedated" Plan to resume pt Zyprexa dose of 7.5 mg po qhs to aid with pt psychotic depression. Plan to continue med titration of the pt's Lexapro dose from 15 mg to 20 mg po q am to further alleviate the pt's severe and likely " double" depression , the latter endorsed by the pt who spoke of years of low lying depression with more recently superimposed clinical major depression with psychotic features. The pt was introspective and able to talk of his serious suicide attempt related to worsening persistent depression + major depression with psychosis. The pt has not evidenced any symptoms of osmar or hypomania with slow titration of his Lexapro. The pt denies any current SI /HI/AH.   2/10/25 Pt seen and discussed with treatment team. The pt continues to clinically improve with more consistent sleep and with a heartier appetite. The pt is tolerating his current med regimen of Zyprexa 7.5 mg po qhs and Lexapro titrated to 20 mg po q am to alleviate the pt's symptoms of a clinical depression with psychotic features. The pt has been visible on the unit and friendly and appropriately social with staff and peers. The pt 's mother had called again today and this writer reviewed with her the updated pt clinical status and plans for pt DC home with aftercare intake at [facility] on 2/13/25 at 11:15 am with Amanuel for ongoing pt therapy and med management. The pt has been actively participating in therapy groups and he reports feeling future oriented with pt's now continuing to deny any active SI /HI/AH. The pt continues to present as a low risk for either suicide or for impulsive aggression.   2/11/25  Pt seen prior to his discharge home from hospital on 2/11/25 and case discussed with treatment team. The pt continues to clinically improve and he reports consistent improved sleep and appetite and he reports an ongoing resolution of his severe and functionally impairing  double depressive disorders  with resolution of the pt's new onset psychotic depressive symptoms. The pt has been more attentive overall to his ADLs and to his grooming and he has been visible on the unit, selectively social and an increasingly active and meaningful participant in therapy groups held on the unit. The pt continues more future oriented and ready to continue with outpatient therapy and med management after his DC home. He continues to value the strong emotional support he has maintained from family, friends and from his employer in the context of the pt's clinical recovery from his longstanding  double depression with onset even prior to high school as the pt reflects on his depressive illness.  The pt continues to tolerate his current med regimen of Lexapro 20 mg po q am and hs Zyprexa 7.5 mg po qhs  with good clinical effet in alleviating the pt's severe persistent depression + major depression with psychotic features. The pt reports no further abdominal discomfort s/p removal of the stables  to his abdomen  on 2/7/25. The pt continues to deny any current active SI /HI/AH/VH and he continues to present as a low risk for either suicide or for impulsive aggression.    Clinical handoff was completed by hospital staff and the pt's medications were e-prescribed to the [facility] in [facility] x a 2 week supply  with 1 RF. The pt has an intake appointment at [facility] in [place] on Thursday 2/13/25 at 11:15 am with therapist Amanuel and  Fide for  referral for ongoing individual therapy and med management. The pt and his family are aware that they can contact Dr Bustamante at [facility] at tel [phone] with any further questions or concerns. 
Patient is a 29 y.o. male, domiciled with family, unemployed, no known PPH, NPMH, does report drinking alcohol (amount recently increased not sure exactly how much). [place] [facility] self inflicted stab would to the abdomen and OD (Benadryl and Acteaminophen).     Patient is alert and oriented to person, place, and time after arriving on inpatient unit. Patient expressed mood as "very depressed" with congruent affect that appears constricted. Patient reports anhedonia, insomnia, and poor appetite that had been increasing till his SA. Patient at this time demonstrates areas of paranoia that stems from an incident that occurred at his previous employer. Patient reports being "recorded" and is now expressing feeling of same and guilt over what he says he was "viewing on the internet". It was confirmed by prior provider that the individual who was reported as recording the event denies having any recording. Patient also discussed the possibility of it becoming public knowledge. Patient discussing concerns of public humiliation and ridicule. It is unclear at this time if this took place or is a possible delusional thinking on the patients side. Patient did feel strongly enough about these events that he did quit his job and became unemployed. Patient still endorses thoughts of suicide at this time.  Patient continues to perseverate on these matters. It is also true that the patient describes himself as OCD and reports having a tendency to "obsess" over issues. Patient reports caring about his family and feel that the would be "better off without him". Patient continues to self describe himself as a "bad person". Patient did mentioned that it is not his families fault as they did not "make me that way".   Plan  1: Patient admitted 2PC at this time.  2: Obtain admission labs (CBC, CMP, U-Tox, ect)  3: Continue Zyprexa 5mg PO at HS for psychosis  4:  Agitation kit to be placed.  5: Acetamiophen 650mg PO Q-6 PRN for pain  1/31/25 Pt seen and discussed with treatment team. The pt reported good sleep and slowly improving appetite but he remains depressed and anxious. The pt spoke of having been depressed " for a very long time but I kept trying to tough it out." This writer spoke with the pt as to the pt's likely diagnosis of a severe major depression with mood congruent psychotic features. The pt spoke of " maybe hearing voices" prior to his having stabbed himself in the abdomen with a knife  " because I felt like my family would be better off if I wasn't around anymore."  The pt spoke of some similarity between himself and 1 of his 4 brothers as to a h/o depression . The pt reported ongoing depression and anxiety but he denied any current active SI /HI//AH. The pt made good eye contact with writer and spoke in soft but coherent and logical statements. The pt is tolerating his current newly begun med regimen of Zyprexa 5 mg po qhs and Lexapro 5 mg po q am to alleviate the pt's severe presumptive psychotic depression. The pt was amenable to having writer give the pt some basic psychoeducational information related to the pt's illness and the treatment options available. The pt presented as neatly attired and well groomed. He attended a therapy group which he found useful and he was encouraged to continue in this important treatment modality.   
29 yr-old WM with major depression with psychotic features. Pt with no significant PMH but suicide attempt with pt   s/p self inflicted stab wound to the epigastrium with wound hematoma evacuation at [place] . Sutures to be removed 2/13/25. Pt begun on Zyprexa now increased to 7.5 mg po qhs and Lexapro increased to 10 mg po q am. Pt still depressed and likely thought disordered but not actively suicidal currently.  First admission. Strong family support  Patient risk is mitigated to low at this time.  Patient is responding well to stage environment, meds management and safety planning.    Plan:    Urgent needs.  Continue with current plan and continue current medication as per treating psychiatrist.    No need for close observation.    
29 yr-old WM with major depression with psychotic features. Pt with no significant PMH but suicide attempt with pt   s/p self inflicted stab wound to the epigastrium with wound hematoma evacuation at [place] . Sutures to be removed 2/13/25. Pt begun on Zyprexa now increased to 7.5 mg po qhs and Lexapro increased to 10 mg po q am. Pt still depressed and likely thought disordered but not actively suicidal currently.  First admission. Strong family support  Patient risk is mitigated to low at this time.  Patient is responding well to stage environment, meds management and safety planning.      2/4/25 Pt seen and discussed with treatment team. The pt has been more visible on the unit and more easily engaged in conversation with peers and staff and more meaningfully participating in therapy groups. The pt has begun to be more introspective in general and he has begun to broaden his perspective as to events leading up to his suicide attempt and subsequent admission initially to surgery and then to inpatient psychiatry for ongoing treatment of his worsening depression with psychosis.  The pt   On 2/4/25 this writer filed a mandatory SAFE ACT report  and then called the pt's mother , an RN who was familiar with the SAFE ACT mandate.   The pt's mother reported that she and her  had removed a gun used by the pt and his cousin at a local target range from the home which they share with the pt when the pt was admitted to [facility] the week of 1/27/25 . The pt reported good sleep and appetite and he continues more future oriented as to outpatient the pt's preference for  in person therapy and med management and for pt future occupational and travel plans.  The pt continues to tolerate his current med regimen of Zyprexa 7.5 mg po qhs and Lexapro 10 mg po q am with plan to further titrate the pt's med regimen as clinically indicated.    The pt continues to deny any current active SI /HI/ AH /VH and he continues to present currently as a low risk for either suicide or for impulsive aggression.   2/5/25 Pt seen and discussed with treatment team. The pt noted that in retrospect that he thinks he was hearing  possible voices prior to his having made this serious suicide attempt via stabbing himself in the abdomen with a knife. The pt's wound s/p hematoma evacuation and stables per surgery while at [place] remain clean, dry and intact with daily Bacitracin ointment applied and with no pt report of pain. This writer spoke with the pt's mother and with the pt as to plan to consult the surgical PA in house to assess the pt's wound and the readiness to have the staples removed prior to pt DC home when clinically stable. The pt spoke of some daytime fatigue and he denied any further recurrence of SI /HI /AH /VH . Plan discussed with the pt to lower the pt's hs Zyprexa dose from 7.5 mg to 5 mg po qhs and to increase the pt's Lexapro dose from 10 mg to 15 mg po q am to further alleviate the pt's long standing depression.  The pt has been eating well and he has been attending therapy groups and actively participating in the treatment offered.   2/6/25 Pt seen and discussed with treatment team. The pt was visible on the unit and participated in therapy groups. The pt reported good sleep and appetite and he continues to tolerate his current med regimen of Zyprexa 5 mg po qhs and Lexapro now titrated to 15 mg po q am. The pt denies any current SI /HI/AH and he continues to express more optimistic future oriented thinking.  The pt's abdominal staples/sutures were removed w/o incident by the surgical PA.   2/7/25 Pt seen in the day room and discussed with treatment team. The pt appeared calm but still somewhat dysphoric with constricted affect and he appeared tired after not having slept well the past 2 nights after the pt's Zyprexa dose was lowered at his request due to c/o feeling 'foggy and sedated" Plan to resume pt Zyprexa dose of 7.5 mg po qhs to aid with pt psychotic depression. Plan to continue med titration of the pt's Lexapro dose from 15 mg to 20 mg po q am to further alleviate the pt's severe and likely " double" depression , the latter endorsed by the pt who spoke of years of low lying depression with more recently superimposed clinical major depression with psychotic features. The pt was introspective and able to talk of his serious suicide attempt related to worsening persistent depression + major depression with psychosis. The pt has not evidenced any symptoms of osmar or hypomania with slow titration of his Lexapro. The pt denies any current SI /HI/AH. 
Patient is a 29 y.o. male, domiciled with family, unemployed, no known PPH, NPMH, does report drinking alcohol (amount recently increased not sure exactly how much). Patient was [place] [facility] [place] by self inflicted stab would to the abdomen and OD (Benadryl and Acteaminophen).     Patient is alert and oriented to person, place, and time after arriving on inpatient unit. Patient expressed mood as "very depressed" with congruent affect that appears constricted. Patient reports anhedonia, insomnia, and poor appetite that had been increasing till his SA. Patient at this time demonstrates areas of paranoia that stems from an incident that occurred at his previous employer. Patient reports being "recorded" and is now expressing feeling of same and guilt over what he says he was "viewing on the internet". It was confirmed by prior provider that the individual who was reported as recording the event denies having any recording. Patient also discussed the possibility of it becoming public knowledge. Patient discussing concerns of public humiliation and ridicule. It is unclear at this time if this took place or is a possible delusional thinking on the patients side. Patient did feel strongly enough about these events that he did quit his job and became unemployed. Patient still endorses thoughts of suicide at this time.  Patient continues to perseverate on these matters. It is also true that the patient describes himself as OCD and reports having a tendency to "obsess" over issues. Patient reports caring about his family and feel that the would be "better off without him". Patient continues to self describe himself as a "bad person". Patient did mentioned that it is not his families fault as they did not "make me that way".     Plan    1: Patient admitted 2PC at this time.    2: Obtain admission labs (CBC, CMP, U-Tox, ect)    3: Continue Zyprexa 5mg PO at HS for psychosis    4:  Agitation kit to be placed.    5: Acetamiophen 650mg PO Q-6 PRN for pain  
29 yr-old WM with major depression with psychotic features. Pt with no significant PMH but suicide attempt with pt   s/p self inflicted stab wound to the epigastrium with wound hematoma evacuation at [place] . Sutures to be removed 2/13/25. Pt begun on Zyprexa now increased to 7.5 mg po qhs and Lexapro increased to 10 mg po q am. Pt still depressed and likely thought disordered but not actively suicidal currently.  First admission. Strong family support  Patient risk is mitigated to low at this time.  Patient is responding well to stage environment, meds management and safety planning.      2/4/25 Pt seen and discussed with treatment team. The pt has been more visible on the unit and more easily engaged in conversation with peers and staff and more meaningfully participating in therapy groups. The pt has begun to be more introspective in general and he has begun to broaden his perspective as to events leading up to his suicide attempt and subsequent admission initially to surgery and then to inpatient psychiatry for ongoing treatment of his worsening depression with psychosis.  The pt   On 2/4/25 this writer filed a mandatory SAFE ACT report  and then called the pt's mother , an RN who was familiar with the SAFE ACT mandate.   The pt's mother reported that she and her  had removed a gun used by the pt and his cousin at a local target range from the home which they share with the pt when the pt was admitted to [facility] the week of 1/27/25 . The pt reported good sleep and appetite and he continues more future oriented as to outpatient the pt's preference for  in person therapy and med management and for pt future occupational and travel plans.  The pt continues to tolerate his current med regimen of Zyprexa 7.5 mg po qhs and Lexapro 10 mg po q am with plan to further titrate the pt's med regimen as clinically indicated.    The pt continues to deny any current active SI /HI/ AH /VH and he continues to present currently as a low risk for either suicide or for impulsive aggression.   2/5/25 Pt seen and discussed with treatment team. The pt noted that in retrospect that he thinks he was hearing  possible voices prior to his having made this serious suicide attempt via stabbing himself in the abdomen with a knife. The pt's wound s/p hematoma evacuation and stables per surgery while at [place] remain clean, dry and intact with daily Bacitracin ointment applied and with no pt report of pain. This writer spoke with the pt's mother and with the pt as to plan to consult the surgical PA in house to assess the pt's wound and the readiness to have the staples removed prior to pt DC home when clinically stable. The pt spoke of some daytime fatigue and he denied any further recurrence of SI /HI /AH /VH . Plan discussed with the pt to lower the pt's hs Zyprexa dose from 7.5 mg to 5 mg po qhs and to increase the pt's Lexapro dose from 10 mg to 15 mg po q am to further alleviate the pt's long standing depression.  The pt has been eating well and he has been attending therapy groups and actively participating in the treatment offered. 
Patient is a 29 y.o. male, domiciled with family, unemployed, no known PPH, NPMH, does report drinking alcohol (amount recently increased not sure exactly how much). [place] [facility] [place] self inflicted stab would to the abdomen and OD (Benadryl and Acteaminophen).     Patient is alert and oriented to person, place, and time after arriving on inpatient unit. Patient expressed mood as "very depressed" with congruent affect that appears constricted. Patient reports anhedonia, insomnia, and poor appetite that had been increasing till his SA. Patient at this time demonstrates areas of paranoia that stems from an incident that occurred at his previous employer. Patient reports being "recorded" and is now expressing feeling of same and guilt over what he says he was "viewing on the internet". It was confirmed by prior provider that the individual who was reported as recording the event denies having any recording. Patient also discussed the possibility of it becoming public knowledge. Patient discussing concerns of public humiliation and ridicule. It is unclear at this time if this took place or is a possible delusional thinking on the patients side. Patient did feel strongly enough about these events that he did quit his job and became unemployed. Patient still endorses thoughts of suicide at this time.  Patient continues to perseverate on these matters. It is also true that the patient describes himself as OCD and reports having a tendency to "obsess" over issues. Patient reports caring about his family and feel that the would be "better off without him". Patient continues to self describe himself as a "bad person". Patient did mentioned that it is not his families fault as they did not "make me that way".   Plan  1: Patient admitted 2PC at this time.  2: Obtain admission labs (CBC, CMP, U-Tox, ect)  3: Continue Zyprexa 5mg PO at HS for psychosis  4:  Agitation kit to be placed.  5: Acetamiophen 650mg PO Q-6 PRN for pain  1/31/25 Pt seen and discussed with treatment team. The pt reported good sleep and slowly improving appetite but he remains depressed and anxious. The pt spoke of having been depressed " for a very long time but I kept trying to tough it out." This writer spoke with the pt as to the pt's likely diagnosis of a severe major depression with mood congruent psychotic features. The pt spoke of " maybe hearing voices" prior to his having stabbed himself in the abdomen with a knife  " because I felt like my family would be better off if I wasn't around anymore."  The pt spoke of some similarity between himself and 1 of his 4 brothers as to a h/o depression . The pt reported ongoing depression and anxiety but he denied any current active SI /HI//AH. The pt made good eye contact with writer and spoke in soft but coherent and logical statements. The pt is tolerating his current newly begun med regimen of Zyprexa 5 mg po qhs and Lexapro 5 mg po q am to alleviate the pt's severe presumptive psychotic depression. The pt was amenable to having writer give the pt some basic psychoeducational information related to the pt's illness and the treatment options available. The pt presented as neatly attired and well groomed. He attended a therapy group which he found useful and he was encouraged to continue in this important treatment modality.   
29 yr-old WM with major depression with psychotic features. Pt with no significant PMH but suicide attempt with pt   s/p self inflicted stab wound to the epigastrium with wound hematoma evacuation at [place] . Sutures to be removed 2/13/25. Pt begun on Zyprexa now increased to 7.5 mg po qhs and Lexapro increased to 10 mg po q am. Pt still depressed and likely thought disordered but not actively suicidal currently.  First admission. Strong family support  Patient risk is mitigated to low at this time.  Patient is responding well to stage environment, meds management and safety planning.      2/4/25 Pt seen and discussed with treatment team. The pt has been more visible on the unit and more easily engaged in conversation with peers and staff and more meaningfully participating in therapy groups. The pt has begun to be more introspective in general and he has begun to broaden his perspective as to events leading up to his suicide attempt and subsequent admission initially to surgery and then to inpatient psychiatry for ongoing treatment of his worsening depression with psychosis.  The pt   On 2/4/25 this writer filed a mandatory SAFE ACT report  and then called the pt's mother , an RN who was familiar with the SAFE ACT mandate.   The pt's mother reported that she and her  had removed a gun used by the pt and his cousin at a local target range from the home which they share with the pt when the pt was admitted to [facility] the week of 1/27/25 . The pt reported good sleep and appetite and he continues more future oriented as to outpatient the pt's preference for  in person therapy and med management and for pt future occupational and travel plans.  The pt continues to tolerate his current med regimen of Zyprexa 7.5 mg po qhs and Lexapro 10 mg po qam with plan to further titrate the pt's med regimen as clinically indicated.    The pt continues to deny any current active SI /HI/AH/VH and he continues to present currently as a low risk for either suicide or for impulsive aggression.   
29 yr-old WM with major depression with psychotic features. Pt with no significant PMH but suicide attempt with pt   s/p self inflicted stab wound to the epigastrium with wound hematoma evacuation at [place] . Sutures to be removed 2/13/25. Pt begun on Zyprexa now increased to 7.5 mg po qhs and Lexapro increased to 10 mg po q am. Pt still depressed and likely thought disordered but not actively suicidal currently.  First admission. Strong family support  Patient risk is mitigated to low at this time.  Patient is responding well to stage environment, meds management and safety planning.    2/4/25 Pt seen and discussed with treatment team. The pt has been more visible on the unit and more easily engaged in conversation with peers and staff and more meaningfully participating in therapy groups. The pt has begun to be more introspective in general and he has begun to broaden his perspective as to events leading up to his suicide attempt and subsequent admission initially to surgery and then to inpatient psychiatry for ongoing treatment of his worsening depression with psychosis.  The pt   On 2/4/25 this writer filed a mandatory SAFE ACT report  and then called the pt's mother , an RN who was familiar with the SAFE ACT mandate.   The pt's mother reported that she and her  had removed a gun used by the pt and his cousin at a local target range from the home which they share with the pt when the pt was admitted to [facility] the week of 1/27/25 . The pt reported good sleep and appetite and he continues more future oriented as to outpatient the pt's preference for  in person therapy and med management and for pt future occupational and travel plans.  The pt continues to tolerate his current med regimen of Zyprexa 7.5 mg po qhs and Lexapro 10 mg po q am with plan to further titrate the pt's med regimen as clinically indicated.    The pt continues to deny any current active SI /HI/ AH /VH and he continues to present currently as a low risk for either suicide or for impulsive aggression.   2/5/25 Pt seen and discussed with treatment team. The pt noted that in retrospect that he thinks he was hearing  possible voices prior to his having made this serious suicide attempt via stabbing himself in the abdomen with a knife. The pt's wound s/p hematoma evacuation and stables per surgery while at [place] remain clean, dry and intact with daily Bacitracin ointment applied and with no pt report of pain. This writer spoke with the pt's mother and with the pt as to plan to consult the surgical PA in house to assess the pt's wound and the readiness to have the staples removed prior to pt DC home when clinically stable. The pt spoke of some daytime fatigue and he denied any further recurrence of SI /HI /AH /VH . Plan discussed with the pt to lower the pt's hs Zyprexa dose from 7.5 mg to 5 mg po qhs and to increase the pt's Lexapro dose from 10 mg to 15 mg po q am to further alleviate the pt's long standing depression.  The pt has been eating well and he has been attending therapy groups and actively participating in the treatment offered.   2/6/25 Pt seen and discussed with treatment team. The pt was visible on the unit and participated in therapy groups. The pt reported good sleep and appetite and he continues to tolerate his current med regimen of Zyprexa 5 mg po qhs and Lexapro now titrated to 15 mg po q am. The pt denies any current SI /HI/AH and he continues to express more optimistic future oriented thinking.  The pt's abdominal staples/sutures were removed w/o incident by the surgical PA.   2/7/25 Pt seen in the day room and discussed with treatment team. The pt appeared calm but still somewhat dysphoric with constricted affect and he appeared tired after not having slept well the past 2 nights after the pt's Zyprexa dose was lowered at his request due to c/o feeling 'foggy and sedated" Plan to resume pt Zyprexa dose of 7.5 mg po qhs to aid with pt psychotic depression. Plan to continue med titration of the pt's Lexapro dose from 15 mg to 20 mg po q am to further alleviate the pt's severe and likely " double" depression , the latter endorsed by the pt who spoke of years of low lying depression with more recently superimposed clinical major depression with psychotic features. The pt was introspective and able to talk of his serious suicide attempt related to worsening persistent depression + major depression with psychosis. The pt has not evidenced any symptoms of osmar or hypomania with slow titration of his Lexapro. The pt denies any current SI /HI/AH.   2/10/25 Pt seen and discussed with treatment team. The pt continues to clinically improve with more consistent sleep and with a heartier appetite. The pt is tolerating his current med regimen of Zyprexa 7.5 mg po qhs and Lexapro titrated to 20 mg po q am to alleviate the pt's symptoms of a clinical depression with psychotic features. The pt has been visible on the unit and friendly and appropriately social with staff and peers. The pt 's mother had called again today and this writer reviewed with her the updated pt clinical status and plans for pt DC home with aftercare intake at [facility] on 2/13/25 at 11:15 am with Amanuel for ongoing pt therapy and med management. The pt has been actively participating in therapy groups and he reports feeling future oriented with pt's now continuing to deny any active SI /HI/AH. The pt continues to present as a low risk for either suicide or for impulsive aggression. 
29 yr-old WM with major depression with psychotic features. Pt with no significant PMH but suicide attempt with pt   s/p self inflicted stab wound to the epigastrium with wound hematoma evacuation at [place] . Sutures to be removed 2/13/25. Pt begun on Zyprexa now increased to 7.5 mg po qhs and Lexapro increased to 10 mg po q am. Pt still depressed and likely thought disordered but not actively suicidal currently.  First admission. Strong family support  Patient risk is mitigated to low at this time.  Patient is responding well to stage environment, meds management and safety planning.    Plan:    Urgent needs.  Continue with current plan and continue current medication as per treating psychiatrist.    No need for close observation.

## 2025-02-11 NOTE — BH INPATIENT PSYCHIATRY PROGRESS NOTE - NSTXSUICIDDATEEST_PSY_ALL_CORE
29-Jan-2025

## 2025-02-11 NOTE — BH INPATIENT PSYCHIATRY DISCHARGE NOTE - DISCHARGE SERVICE FOR PATIENT
Airway  Date/Time: 5/23/2024 2:48 PM  Urgency: elective    Airway not difficult    General Information and Staff    Patient location during procedure: OR  Anesthesiologist: Rj Mcqueen MD  Resident/CRNA: Bryanna Bailey CRNA  Performed: CRNA   Performed by: Bryanna Bailey CRNA  Authorized by: Rj Mcqueen MD      Indications and Patient Condition  Indications for airway management: anesthesia  Spontaneous Ventilation: absent  Sedation level: deep  Preoxygenated: yes  Patient position: sniffing  Mask difficulty assessment: 2 - vent by mask + OA or adjuvant +/- NMBA    Final Airway Details  Final airway type: endotracheal airway      Successful airway: ETT  Cuffed: yes   Successful intubation technique: direct laryngoscopy  Facilitating devices/methods: intubating stylet  Endotracheal tube insertion site: oral  Blade: Geo  Blade size: #3  ETT size (mm): 7.0    Cormack-Lehane Classification: grade I - full view of glottis  Placement verified by: capnometry   Measured from: lips  ETT to lips (cm): 21  Number of attempts at approach: 1         on the discharge service for the patient. I have reviewed and made amendments to the documentation where necessary.

## 2025-02-11 NOTE — BH TREATMENT PLAN - NSTXDCOTHRINTERSW_PSY_ALL_CORE
SW will meet with patient, have contact with family to obtain hx and input into treatment and discharge planning.  Patient will have safe placement upon  d/c from  and he will have an appointment for psychiatric and JAILENE treatment if appropriate within 5 business days of d/c from . Patient will also have resources to address and SDOH needs identified during this admission.
ENRICO spoke with Dr. Bustamante about time frame for discharge. ENRICO also spoke with patient and his mother. Mother feels patient is doing very well. Referral made this morning to [facility] in [place] in preparation for likely discharge within the next couple of days. Spoke with Tegan at [facility] who provided pt with an in-person intake appt on Thursday, 2/13/25 at 11:15AM with , Amanuel and , Fide. Patient is also being referred to their SP[facility] program. Tx team aware.
SW emailed financial counseling to request assistance with pt's insurance. Case discussed in treatment team and with psychitraist. SW met with pt to discuss discharge planning. Pt agreeable to referral to [facility] in [place] for aftercare. Pt endorsed that he resides with his parents and signed consent for SW to speak with his mother and father. SW called pt's mother. SW introduced herself and explained role. SW updated her that pt referred to financial counseling for assistance with his insurance. SW also discussed plan for pt to be referred to [facility] in [place]. Mother agreeable with plan. Mother endorsed that pt is able to return home upon discharge. SW to continue to follow.

## 2025-02-11 NOTE — BH TREATMENT PLAN - NSPTSTATEDGOAL_PSY_ALL_CORE
Detail Level: Zone
 " I just feel like I let my family down"
to feel better and get back home and maybe travel
to feel better and get back home and maybe travel

## 2025-02-11 NOTE — BH INPATIENT PSYCHIATRY DISCHARGE NOTE - NSDCCPCAREPLAN_GEN_ALL_CORE_FT
PRINCIPAL DISCHARGE DIAGNOSIS  Diagnosis: MDD (major depressive disorder), recurrent, severe, with psychosis  Assessment and Plan of Treatment:       SECONDARY DISCHARGE DIAGNOSES  Diagnosis: Persistent depressive disorder  Assessment and Plan of Treatment:

## 2025-02-11 NOTE — BH INPATIENT PSYCHIATRY DISCHARGE NOTE - NSCORESITESY/N_GEN_A_CORE_RD
Called and discussed with father of patient.  He actually did receive his flu vaccine this year.  As such I do not recommend him going on the Tamiflu.  Discussed rationale as to why.  Patient is in the low risk category and did have his flu shot.  
Medication Request  Medication name: tamiflu  Pharmacy Name and Location: [place], [place]  Reason for request: sister and mother both diagnosed with influenza A and father concerned he might get the same thing  When did you use medication last?:  n/a  Patient offered appointment:  patient declined  Okay to leave a detailed message: yes    
Yes

## 2025-02-11 NOTE — BH INPATIENT PSYCHIATRY PROGRESS NOTE - NSBHMSETHTPROC_PSY_A_CORE
Linear/Impaired reasoning
Overinclusive/Perseverative/Impaired reasoning
Linear/Impaired reasoning
Linear/Normal reasoning
Linear/Impaired reasoning

## 2025-02-11 NOTE — BH INPATIENT PSYCHIATRY PROGRESS NOTE - NSBHMSEGAIT_PSY_A_CORE
Normal gait / station
Unable to assess
Normal gait / station

## 2025-02-11 NOTE — BH INPATIENT PSYCHIATRY PROGRESS NOTE - NSBHMSETHTCONTENT_PSY_A_CORE
Preoccupations/Ruminations/Other
Delusions/Preoccupations/Ruminations/Hopelessness/Guilt
Delusions/Preoccupations/Ruminations/Hopelessness/Guilt
Preoccupations/Ruminations/Other
Preoccupations/Ruminations/Other
Delusions/Preoccupations/Ruminations/Hopelessness/Guilt
Delusions/Ruminations/Hopelessness/Guilt
Preoccupations/Ruminations/Other
Unremarkable/Preoccupations/Other

## 2025-02-11 NOTE — BH INPATIENT PSYCHIATRY DISCHARGE NOTE - HPI (INCLUDE ILLNESS QUALITY, SEVERITY, DURATION, TIMING, CONTEXT, MODIFYING FACTORS, ASSOCIATED SIGNS AND SYMPTOMS)
Patient is a 29 y.o. male, domiciled with family, unemployed, no known PPH, NPMH, does report drinking alcohol (amount recently increased not sure exactly how much). Patient was direct transfer from [facility] after SA by self inflicted stab would to the abdomen and OD (Benadryl and Acteaminophen).     Patient is alert and oriented to person, place, and time after arriving on inpatient unit. Patient expressed mood as "very depressed" with congruent affect that appears constricted. Patient reports anhedonia, insomnia, and poor appetite that had been increasing till his SA. Patient at this time demonstrates areas of paranoia that stems from an incident that occurred at his previous employer. Patient reports being "recorded" and is now expressing feeling of same and guilt over what he says he was "viewing on the internet". It was confirmed by prior provider that the individual who was reported as recording the event denies having any recording. Patient also discussed the possibility of it becoming public knowledge. Patient discussing concerns of public humiliation and ridicule. It is unclear at this time if this took place or is a possible delusional thinking on the patients side. Patient did feel strongly enough about these events that he did quit his job and became unemployed. Patient still endorses thoughts of suicide at this time.  Patient continues to perseverate on these matters. It is also true that the patient describes himself as OCD and reports having a tendency to "obsess" over issues. Patient reports careing about his family and feel that the would be "better off without him". Patient continues to self describe himself as a "bad person". Patient did mentioned that it is not his families fault as they did not "make me that way".

## 2025-02-11 NOTE — BH INPATIENT PSYCHIATRY DISCHARGE NOTE - NSDCPROCEDURESFT_PSY_ALL_CORE
TSH= 1.28  Fasting lipids  Cholesterol = 142  VN=930  HgA1C= 4.9  SARS COV 2 / RSV /INFLUENZA all not detected  1/27/25  COVID AB + >250    1/30/25  No studies are pending   TSH= 1.28  Fasting lipids  Cholesterol = 142  HT=483  HgA1C= 4.9  SARS COV 2 / RSV /INFLUENZA all not detected  1/27/25  COVID AB + >250    1/30/25  EKG  VR= 68  QT /QTc = 390/419  No studies are pending

## 2025-02-11 NOTE — BH TREATMENT PLAN - NSTXDEPRESINTERMD_PSY_ALL_CORE
1.Pt amenable to continue a trial of Zyprexa Zydis 5 mg po qhs to alleviate pt depression with psychotic features  2.Pt amenable to a trial of Lexapro 5 mg po q am to alleviate the pt's anxious depression  3.Pt  encouraged to attend therapy groups as tolerated  4.Clinical pt collateral history to be obtained from the pt and his family with pt permission (given to hospital staff)  5.Hospitalist H and P and pt surgical follow up after DC home s/p pt SIB via stabbing self in the abdomen prior to this admission to hospital after pt initial admission to Surgery at [facility] on 1/27/25.  6.Disposition and safety  planning ongoing 
1.Pt amenable to continue a trial of Zyprexa Zydis 5 mg po qhs to alleviate pt depression with psychotic features  2.Pt amenable to a trial of Lexapro increased to 15 mg po q am to alleviate the pt's anxious depression  3.Pt  encouraged to attend therapy groups as tolerated  4.Clinical pt collateral history to be obtained from the pt and his family with pt permission (given to hospital staff)  5.Hospitalist H and P and pt surgical follow up after DC home s/p pt SIB via stabbing self in the abdomen prior to this admission to hospital after pt initial admission to Surgery at [facility] on 1/27/25.  6.Disposition and safety  planning ongoing 
1.Pt amenable to continue a trial of Zyprexa Zydis 7.5 mg po qhs to alleviate pt depression with psychotic features  2.Pt amenable to a trial of Lexapro increased to 20 mg po q am to alleviate the pt's anxious depression  3.Pt  encouraged to attend therapy groups as tolerated  4.Clinical pt collateral history to be obtained from the pt and his family with pt permission (given to hospital staff)  5.Hospitalist H and P and pt surgical follow up after DC home s/p pt SIB via stabbing self in the abdomen prior to this admission to hospital after pt initial admission to Surgery at [facility] on 1/27/25.  6.Disposition and safety  planning ongoing  with pt for DC home on 2/11/25 and intake [facility] on 2/13/25 at 11:15 am with Amanuel  7.Safety planning reviewed and pt and family aware they can contact Dr Bustamante at [facility] at tel [phone] with any further questions or concerns

## 2025-02-11 NOTE — BH INPATIENT PSYCHIATRY DISCHARGE NOTE - REASON FOR ADMISSION
s/p pt suicide attempt via stabbing self in the context of worsening persistent depression + new onset psychotic depression s/p pt suicide attempt via stabbing self in the context of worsening persistent depression + new onset psychotic depression   Pt also reported  ETOH use and an OD of unknown amount of Tylenol and Benadryl

## 2025-02-11 NOTE — BH TREATMENT PLAN - NSDCCRITERIA_PSY_ALL_CORE
Patient will no longer be suicidal  or thought disordered
Patient will no longer be suicidal or delusional 
Patient will no longer be suicidal or delusional

## 2025-02-11 NOTE — BH INPATIENT PSYCHIATRY PROGRESS NOTE - NSBHCHARTREVIEWVS_PSY_A_CORE FT
Vital Signs Last 24 Hrs  T(C): 36.3 (02-07-25 @ 07:00), Max: 36.3 (02-07-25 @ 07:00)  T(F): 97.3 (02-07-25 @ 07:00), Max: 97.3 (02-07-25 @ 07:00)  HR: --  BP: --  BP(mean): --  RR: 19 (02-07-25 @ 07:00) (19 - 19)  SpO2: 100% (02-07-25 @ 07:00) (100% - 100%)    Orthostatic VS  02-07-25 @ 07:00  Lying BP: --/-- HR: --  Sitting BP: 119/88 HR: 77  Standing BP: 130/92 HR: 94  Site: upper right arm  Mode: electronic  Orthostatic VS  02-06-25 @ 08:22  Lying BP: --/-- HR: --  Sitting BP: 117/85 HR: 76  Standing BP: 114/73 HR: 95  Site: upper right arm  Mode: electronic  
Vital Signs Last 24 Hrs  T(C): 36.2 (02-03-25 @ 07:17), Max: 36.2 (02-03-25 @ 07:17)  T(F): 97.2 (02-03-25 @ 07:17), Max: 97.2 (02-03-25 @ 07:17)  HR: --  BP: --  BP(mean): --  RR: 16 (02-03-25 @ 07:55) (16 - 16)  SpO2: 100% (02-03-25 @ 07:17) (100% - 100%)    Orthostatic VS  02-03-25 @ 07:55  Lying BP: --/-- HR: --  Sitting BP: 135/70 HR: 87  Standing BP: 132/79 HR: 100  Site: --  Mode: electronic  Orthostatic VS  02-03-25 @ 07:17  Lying BP: --/-- HR: --  Sitting BP: 144/91 HR: 73  Standing BP: 130/96 HR: 96  Site: upper right arm  Mode: electronic  Orthostatic VS  02-02-25 @ 07:53  Lying BP: --/-- HR: --  Sitting BP: 141/92 HR: 79  Standing BP: 145/96 HR: 95  Site: upper right arm  Mode: electronic  
Vital Signs Last 24 Hrs  T(C): 36.5 (02-06-25 @ 08:22), Max: 36.5 (02-06-25 @ 08:22)  T(F): 97.7 (02-06-25 @ 08:22), Max: 97.7 (02-06-25 @ 08:22)  HR: --  BP: --  BP(mean): --  RR: 18 (02-06-25 @ 08:22) (18 - 18)  SpO2: 99% (02-06-25 @ 08:22) (99% - 99%)    Orthostatic VS  02-06-25 @ 08:22  Lying BP: --/-- HR: --  Sitting BP: 117/85 HR: 76  Standing BP: 114/73 HR: 95  Site: upper right arm  Mode: electronic  Orthostatic VS  02-05-25 @ 08:28  Lying BP: --/-- HR: --  Sitting BP: 130/78 HR: 76  Standing BP: 121/84 HR: 98  Site: upper right arm  Mode: electronic  
Vital Signs Last 24 Hrs  T(C): 36.4 (02-10-25 @ 07:22), Max: 36.4 (02-10-25 @ 07:22)  T(F): 97.5 (02-10-25 @ 07:22), Max: 97.5 (02-10-25 @ 07:22)  HR: --  BP: --  BP(mean): --  RR: 17 (02-10-25 @ 07:22) (17 - 17)  SpO2: 100% (02-10-25 @ 07:22) (100% - 100%)    Orthostatic VS  02-10-25 @ 07:22  Lying BP: --/-- HR: --  Sitting BP: 145/87 HR: 78  Standing BP: 137/88 HR: 88  Site: upper right arm  Mode: electronic  Orthostatic VS  02-09-25 @ 07:22  Lying BP: --/-- HR: --  Sitting BP: 132/91 HR: 75  Standing BP: 127/80 HR: 92  Site: upper right arm  Mode: electronic  
Vital Signs Last 24 Hrs  T(C): 36.5 (02-04-25 @ 07:20), Max: 36.5 (02-04-25 @ 07:20)  T(F): 97.7 (02-04-25 @ 07:20), Max: 97.7 (02-04-25 @ 07:20)  HR: --  BP: --  BP(mean): --  RR: 16 (02-04-25 @ 07:20) (16 - 16)  SpO2: 100% (02-04-25 @ 07:20) (100% - 100%)    Orthostatic VS  02-04-25 @ 07:20  Lying BP: --/-- HR: --  Sitting BP: 151/90 HR: 87  Standing BP: 144/92 HR: 100  Site: upper right arm  Mode: electronic  Orthostatic VS  02-03-25 @ 07:55  Lying BP: --/-- HR: --  Sitting BP: 135/70 HR: 87  Standing BP: 132/79 HR: 100  Site: --  Mode: electronic  Orthostatic VS  02-03-25 @ 07:17  Lying BP: --/-- HR: --  Sitting BP: 144/91 HR: 73  Standing BP: 130/96 HR: 96  Site: upper right arm  Mode: electronic  
Vital Signs Last 24 Hrs  T(C): 36.3 (02-05-25 @ 08:28), Max: 36.3 (02-05-25 @ 08:28)  T(F): 97.4 (02-05-25 @ 08:28), Max: 97.4 (02-05-25 @ 08:28)  HR: --  BP: --  BP(mean): --  RR: 18 (02-05-25 @ 08:28) (18 - 18)  SpO2: 100% (02-05-25 @ 08:28) (100% - 100%)    Orthostatic VS  02-05-25 @ 08:28  Lying BP: --/-- HR: --  Sitting BP: 130/78 HR: 76  Standing BP: 121/84 HR: 98  Site: upper right arm  Mode: electronic  Orthostatic VS  02-04-25 @ 07:20  Lying BP: --/-- HR: --  Sitting BP: 151/90 HR: 87  Standing BP: 144/92 HR: 100  Site: upper right arm  Mode: electronic  
Vital Signs Last 24 Hrs  T(C): 36.3 (02-02-25 @ 07:53), Max: 36.3 (02-02-25 @ 07:53)  T(F): 97.4 (02-02-25 @ 07:53), Max: 97.4 (02-02-25 @ 07:53)  HR: --  BP: --  BP(mean): --  RR: 18 (02-02-25 @ 07:53) (18 - 18)  SpO2: 100% (02-02-25 @ 07:53) (100% - 100%)    Orthostatic VS  02-02-25 @ 07:53  Lying BP: --/-- HR: --  Sitting BP: 141/92 HR: 79  Standing BP: 145/96 HR: 95  Site: upper right arm  Mode: electronic  Orthostatic VS  02-01-25 @ 07:45  Lying BP: --/-- HR: --  Sitting BP: 130/90 HR: 100  Standing BP: 122/76 HR: 102  Site: upper right arm  Mode: electronic  
Vital Signs Last 24 Hrs  T(C): 36.4 (02-11-25 @ 07:36), Max: 36.4 (02-11-25 @ 07:36)  T(F): 97.5 (02-11-25 @ 07:36), Max: 97.5 (02-11-25 @ 07:36)  HR: --  BP: --  BP(mean): --  RR: 16 (02-11-25 @ 07:36) (16 - 16)  SpO2: 100% (02-11-25 @ 07:36) (100% - 100%)    Orthostatic VS  02-11-25 @ 07:36  Lying BP: --/-- HR: --  Sitting BP: 129/92 HR: 86  Standing BP: 130/91 HR: 93  Site: upper right arm  Mode: electronic  Orthostatic VS  02-10-25 @ 07:22  Lying BP: --/-- HR: --  Sitting BP: 145/87 HR: 78  Standing BP: 137/88 HR: 88  Site: upper right arm  Mode: electronic  
Vital Signs Last 24 Hrs  T(C): 36.4 (02-01-25 @ 07:45), Max: 36.4 (02-01-25 @ 07:45)  T(F): 97.5 (02-01-25 @ 07:45), Max: 97.5 (02-01-25 @ 07:45)  HR: --  BP: --  BP(mean): --  RR: 18 (02-01-25 @ 07:45) (18 - 18)  SpO2: 100% (02-01-25 @ 07:45) (100% - 100%)    Orthostatic VS  02-01-25 @ 07:45  Lying BP: --/-- HR: --  Sitting BP: 130/90 HR: 100  Standing BP: 122/76 HR: 102  Site: upper right arm  Mode: electronic  Orthostatic VS  01-31-25 @ 07:05  Lying BP: --/-- HR: --  Sitting BP: 148/85 HR: 77  Standing BP: 134/90 HR: 91  Site: upper right arm  Mode: electronic  Orthostatic VS  01-30-25 @ 20:10  Lying BP: --/-- HR: --  Sitting BP: --/-- HR: --  Standing BP: --/-- HR: --  Site: upper right arm  Mode: electronic  
Vital Signs Last 24 Hrs  T(C): 36.6 (01-30-25 @ 07:34), Max: 36.6 (01-30-25 @ 07:34)  T(F): 97.9 (01-30-25 @ 07:34), Max: 97.9 (01-30-25 @ 07:34)  HR: --  BP: --  BP(mean): --  RR: 18 (01-30-25 @ 07:34) (18 - 18)  SpO2: 100% (01-30-25 @ 07:34) (100% - 100%)    Orthostatic VS  01-30-25 @ 07:34  Lying BP: --/-- HR: --  Sitting BP: 134/80 HR: 97  Standing BP: 112/76 HR: 112  Site: upper right arm  Mode: electronic  Orthostatic VS  01-29-25 @ 13:16  Lying BP: --/-- HR: --  Sitting BP: 118/90 HR: 111  Standing BP: 113/78 HR: 136  Site: --  Mode: --  
Vital Signs Last 24 Hrs  T(C): 36.5 (01-31-25 @ 07:05), Max: 36.6 (01-30-25 @ 20:10)  T(F): 97.7 (01-31-25 @ 07:05), Max: 97.9 (01-30-25 @ 20:10)  HR: 91 (01-30-25 @ 20:10) (91 - 91)  BP: 126/80 (01-30-25 @ 20:10) (126/80 - 126/80)  BP(mean): --  RR: 18 (01-31-25 @ 07:05) (18 - 18)  SpO2: 100% (01-31-25 @ 07:05) (96% - 100%)    Orthostatic VS  01-31-25 @ 07:05  Lying BP: --/-- HR: --  Sitting BP: 148/85 HR: 77  Standing BP: 134/90 HR: 91  Site: upper right arm  Mode: electronic  Orthostatic VS  01-30-25 @ 20:10  Lying BP: --/-- HR: --  Sitting BP: --/-- HR: --  Standing BP: --/-- HR: --  Site: upper right arm  Mode: electronic  Orthostatic VS  01-30-25 @ 07:34  Lying BP: --/-- HR: --  Sitting BP: 134/80 HR: 97  Standing BP: 112/76 HR: 112  Site: upper right arm  Mode: electronic

## 2025-02-11 NOTE — BH TREATMENT PLAN - NSTXDCOTHRGOAL_PSY_ALL_CORE
Patient will have social work assessment of clinical and social needs to be addressed in discharge planning.  Patient will have safe placement upon d/c from  and he will have an appointment for continuing in the appropriate level of psychiatric o/p treatment within 5 business days of d/c from , JAILENE if appropriate and Medical F/U as appropriate. Patient will also have resources to address any SDOH needs identified during this admission.
Patient will have social work assessment of clinical and social needs to be addressed in discharge planning.Patient will have safe placement upon d/c from  and he will have an appointment for continuing in the appropriate level of psychiatric o/p treatment within 5 business days of d/c from , JAILENE if appropriate and Medical F/U as appropriate. Patient will also have resources to address any SDOH needs identified during this admission.
Patient will have social work assessment of clinical and social needs to be addressed in discharge planning.Patient will have safe placement upon d/c from  and he will have an appointment for continuing in the appropriate level of psychiatric o/p treatment within 5 business days of d/c from , JAILENE if appropriate and Medical F/U as appropriate. Patient will also have resources to address any SDOH needs identified during this admission.

## 2025-02-11 NOTE — BH INPATIENT PSYCHIATRY DISCHARGE NOTE - NSBHDCRISKMITIGATEFT_PSY_ALL_CORE
Clinical handoff was completed by hospital staff and the pt's medications were e-prescribed to the [facility] in [facility] x a 2 week supply  with 1 RF. The pt has an intake appointment at [facility] in [place] on Thursday 2/13/25 at 11:15 am with therapist Amanuel and  Fide for  referral for ongoing individual therapy and med management. The pt and his family are aware that they can contact Dr Bustamante at [facility] at tel [phone] with any further questions or concerns.   SAFE ACT report filed as mandated.

## 2025-02-11 NOTE — BH TREATMENT PLAN - NSTXPATIENTPARTICIPATE_PSY_ALL_CORE
Patient participated in identification of needs/problems/goals for treatment/Patient participated in defining interventions
Patient participated in identification of needs/problems/goals for treatment
Patient participated in identification of needs/problems/goals for treatment/Patient participated in defining interventions/Patient participated in development of after care plan

## 2025-02-11 NOTE — BH INPATIENT PSYCHIATRY PROGRESS NOTE - NSBHMSEAFFQUAL_PSY_A_CORE
Depressed/Anxious
Depressed
Depressed/Anxious
Euthymic
Depressed/Anxious
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
## 2025-02-11 NOTE — BH INPATIENT PSYCHIATRY PROGRESS NOTE - NSTXSUICIDDATETRGT_PSY_ALL_CORE
09-Feb-2025
16-Feb-2025
05-Feb-2025
09-Feb-2025
09-Feb-2025
22-Nov-2020 16:46
05-Feb-2025
05-Feb-2025
09-Feb-2025
13-Feb-2025
16-Feb-2025
09-Feb-2025

## 2025-02-11 NOTE — BH INPATIENT PSYCHIATRY DISCHARGE NOTE - NSBHMETABOLIC_PSY_ALL_CORE_FT
BMI: BMI (kg/m2): 22.1 (01-29-25 @ 13:16)  HbA1c: A1C with Estimated Average Glucose Result: 4.9 % (01-30-25 @ 07:29)    Glucose:   BP: --Vital Signs Last 24 Hrs  T(C): 36.4 (02-11-25 @ 07:36), Max: 36.4 (02-11-25 @ 07:36)  T(F): 97.5 (02-11-25 @ 07:36), Max: 97.5 (02-11-25 @ 07:36)  HR: --  BP: --  BP(mean): --  RR: 16 (02-11-25 @ 07:36) (16 - 16)  SpO2: 100% (02-11-25 @ 07:36) (100% - 100%)    Orthostatic VS  02-11-25 @ 07:36  Lying BP: --/-- HR: --  Sitting BP: 129/92 HR: 86  Standing BP: 130/91 HR: 93  Site: upper right arm  Mode: electronic  Orthostatic VS  02-10-25 @ 07:22  Lying BP: --/-- HR: --  Sitting BP: 145/87 HR: 78  Standing BP: 137/88 HR: 88  Site: upper right arm  Mode: electronic    Lipid Panel: Date/Time: 01-30-25 @ 07:29  Cholesterol, Serum: 142  LDL Cholesterol Calculated: 79  HDL Cholesterol, Serum: 38  Total Cholesterol/HDL Ration Measurement: --  Triglycerides, Serum: 141

## 2025-02-11 NOTE — BH INPATIENT PSYCHIATRY PROGRESS NOTE - NSDCCRITERIA_PSY_ALL_CORE
Patient will no longer be suicidal or delusional 
Patient will no longer be suicidal  or thought disordered
Patient will no longer be suicidal or delusional

## 2025-02-11 NOTE — BH INPATIENT PSYCHIATRY PROGRESS NOTE - NSBHATTESTBILLING_PSY_A_CORE
74191-Eaggxlpzyh OBS or IP - moderate complexity OR 35-49 mins
[id number]-Jjrvwaeiir OBS or IP - moderate complexity OR 35-49 mins
[id number]-Kxuyihkyxy OBS or IP - moderate complexity OR 35-49 mins
77105-Lswlatfwfn OBS or IP - moderate complexity OR 35-49 mins
10731-Oyjqronwpy OBS or IP - moderate complexity OR 35-49 mins
99617-Puyfynrupw OBS or IP - moderate complexity OR 35-49 mins
33851-Bibqifaakl OBS or IP - moderate complexity OR 35-49 mins
[id number]-Zfnajztapo OBS or IP - moderate complexity OR 35-49 mins
23162-Tawhmhgcgx OBS or IP - moderate complexity OR 35-49 mins
19666-Pgsfrlrrnb OBS or IP - moderate complexity OR 35-49 mins
54932-Vtsihexzss OBS or IP - moderate complexity OR 35-49 mins

## 2025-02-11 NOTE — BH INPATIENT PSYCHIATRY DISCHARGE NOTE - NSBHSUICIDESTATUS_PSY_ALL_CORE
The pt continues to deny any current active SI /HI/AH/ VH  The pt continues to present as a low risk for either suicide or for impulsive aggression

## 2025-02-11 NOTE — BH INPATIENT PSYCHIATRY PROGRESS NOTE - NSBHFUPINTERVALHXFT_PSY_A_CORE
2/2/25 The patient does not have complaints today. He slept well and he ate well.  He denies thoughts about harming self or somebody else.  He denies hearing voices and seeing things.  Met in the privacy of his room.  Does not need any specific help today.  2/3/25 Pt seen alone and later with both his parents during visiting time on the inpatient unit. The pt 's mother also called and with pt permission, she and this writer spoke of the pt's h/o depression and of his recent worsening severity of depression with the new onset of depression related psychotic features including pt earlier reported AH /VH and apparent evidence of pt paranoid, persecutory, somatic and nihilistic delusional thinking which culminated in the pt's having made a suicide attempt via a self inflicted stab wound to his epigastrium requiring hematoma wound evacuation  at [facility] prior to the pt's transfer to [facility] for acute pt safety and for ongoing pt evaluation and treatment of his severe major depression with psychotic features. The pt reported much improved sleep and appetite and he presents as increasingly  calm and focussed , with attention to his ADLs and to his grooming and with the pt's gradual increased visibility on the unit and participation in therapy groups as tolerated.  This writer reviewed by phone with the pt's parents and later with the pt via psychoeducational information the nature of the pt's presumptive psychotic depression along with basic information related to the pt's current med regimen of Lexapro now at 10 mg po q am and Zyprexa now titrated to 7.5 mg po qhs to alleviate the pt's anxious depression and psychosis  respectively.  The pt appeared to better grasp the nature of his mood and thought disorder and he presented as more hopeful and future oriented once he is clinically stable enough for DC home from hospital with aftercare treatment in place to further cement the pt's clinical gains. The pt continues to deny any current SI /HI/ AH and he presents currently as a low risk for either suicide or for impulsive aggression. 
1/30/25 Pt seen and discussed with treatment team.  The pt, who had reportedly not been sleeping or eating well for some time prior to this first inpatient psychiatric hospital admission, spent most of the day resting/sleeping in his room. The pt, exposed to influenza type A but negative himself for  SARS CO V 2  / RSV / INFLUENZA , was begun on a 10 day course of prophylactic Tamiflu 75 mg po q 24 hours and this may also be adding to the pt's general fatigue. The pt 's self inflicted stab wound to his abdomen prior to his initial admission to [facility] with surgical evacuation of an epigastric hematoma , was then transferred to [address] inpatient psychiatry on 1/29/25 for acute safety and for ongoing evaluation and treatment of a presumptive psychotic depression.   The pt has been tolerating his newly begun med regimen of hs Zyprexa Zydis 5 mg PO q hs and low dose Lexapro 5 mg po q am in an effort to alleviate the pt's severe and functionally  impairing  major depression with psychotic features.  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  The pt has surgical follow up scheduled as an outpatient for suture removal when appropriate.        The pt remains depressed and describes feelings of hopelessness and of having let his family down, but he denies any current active SI/ HI /AH.  The pt's judgment remains impaired with limited insight into the nature and severity of his current psychotic depression. 
2/2/25 The patient does not have complaints today. He slept well and he ate well.  He denies thoughts about harming self or somebody else.  He denies hearing voices and seeing things.  Met in the privacy of his room.  Does not need any specific help today.  2/3/25 Pt seen alone and later with both his parents during visiting time on the inpatient unit. The pt 's mother also called and with pt permission, she and this writer spoke of the pt's h/o depression and of his recent worsening severity of depression with the new onset of depression related psychotic features including pt earlier reported AH /VH and apparent evidence of pt paranoid, persecutory, somatic and nihilistic delusional thinking which culminated in the pt's having made a suicide attempt via a self inflicted stab wound to his epigastrium requiring hematoma wound evacuation  at [facility] prior to the pt's transfer to [facility] for acute pt safety and for ongoing pt evaluation and treatment of his severe major depression with psychotic features. The pt reported much improved sleep and appetite and he presents as increasingly  calm and focussed , with attention to his ADLs and to his grooming and with the pt's gradual increased visibility on the unit and participation in therapy groups as tolerated.  This writer reviewed by phone with the pt's parents and later with the pt via psychoeducational information the nature of the pt's presumptive psychotic depression along with basic information related to the pt's current med regimen of Lexapro now at 10 mg po q am and Zyprexa now titrated to 7.5 mg po qhs to alleviate the pt's anxious depression and psychosis  respectively.  The pt appeared to better grasp the nature of his mood and thought disorder and he presented as more hopeful and future oriented once he is clinically stable enough for DC home from hospital with aftercare treatment in place to further cement the pt's clinical gains. The pt continues to deny any current SI /HI/ AH and he presents currently as a low risk for either suicide or for impulsive aggression. 
The patient does not have complaints today. He slept well and he ate well.  He denies thoughts about harming self or somebody else.  He denies hearing voices and seeing things.  Met in the privacy of his room.  Does not need any specific help today.
2/2/25 The patient does not have complaints today. He slept well and he ate well.  He denies thoughts about harming self or somebody else.  He denies hearing voices and seeing things.  Met in the privacy of his room.  Does not need any specific help today.  2/3/25 Pt seen alone and later with both his parents during visiting time on the inpatient unit. The pt 's mother also called and with pt permission, she and this writer spoke of the pt's h/o depression and of his recent worsening severity of depression with the new onset of depression related psychotic features including pt earlier reported AH /VH and apparent evidence of pt paranoid, persecutory, somatic and nihilistic delusional thinking which culminated in the pt's having made a suicide attempt via a self inflicted stab wound to his epigastrium requiring hematoma wound evacuation  at [facility] prior to the pt's transfer to [facility] for acute pt safety and for ongoing pt evaluation and treatment of his severe major depression with psychotic features. The pt reported much improved sleep and appetite and he presents as increasingly  calm and focussed , with attention to his ADLs and to his grooming and with the pt's gradual increased visibility on the unit and participation in therapy groups as tolerated.  This writer reviewed by phone with the pt's parents and later with the pt via psychoeducational information the nature of the pt's presumptive psychotic depression along with basic information related to the pt's current med regimen of Lexapro now at 10 mg po q am and Zyprexa now titrated to 7.5 mg po qhs to alleviate the pt's anxious depression and psychosis  respectively.  The pt appeared to better grasp the nature of his mood and thought disorder and he presented as more hopeful and future oriented once he is clinically stable enough for DC home from hospital with aftercare treatment in place to further cement the pt's clinical gains. The pt continues to deny any current SI /HI/ AH and he presents currently as a low risk for either suicide or for impulsive aggression. 
2/2/25 The patient does not have complaints today. He slept well and he ate well.  He denies thoughts about harming self or somebody else.  He denies hearing voices and seeing things.  Met in the privacy of his room.  Does not need any specific help today.  2/3/25 Pt seen alone and later with both his parents during visiting time on the inpatient unit. The pt 's mother also called and with pt permission, she and this writer spoke of the pt's h/o depression and of his recent worsening severity of depression with the new onset of depression related psychotic features including pt earlier reported AH /VH and apparent evidence of pt paranoid, persecutory, somatic and nihilistic delusional thinking which culminated in the pt's having made a suicide attempt via a self inflicted stab wound to his epigastrium requiring hematoma wound evacuation  at [facility] prior to the pt's transfer to [facility] for acute pt safety and for ongoing pt evaluation and treatment of his severe major depression with psychotic features. The pt reported much improved sleep and appetite and he presents as increasingly  calm and focussed , with attention to his ADLs and to his grooming and with the pt's gradual increased visibility on the unit and participation in therapy groups as tolerated.  This writer reviewed by phone with the pt's parents and later with the pt via psychoeducational information the nature of the pt's presumptive psychotic depression along with basic information related to the pt's current med regimen of Lexapro now at 10 mg po q am and Zyprexa now titrated to 7.5 mg po qhs to alleviate the pt's anxious depression and psychosis  respectively.  The pt appeared to better grasp the nature of his mood and thought disorder and he presented as more hopeful and future oriented once he is clinically stable enough for DC home from hospital with aftercare treatment in place to further cement the pt's clinical gains. The pt continues to deny any current SI /HI/ AH and he presents currently as a low risk for either suicide or for impulsive aggression. 
2/2/25 The patient does not have complaints today. He slept well and he ate well.  He denies thoughts about harming self or somebody else.  He denies hearing voices and seeing things.  Met in the privacy of his room.  Does not need any specific help today.  2/3/25 Pt seen alone and later with both his parents during visiting time on the inpatient unit. The pt 's mother also called and with pt permission, she and this writer spoke of the pt's h/o depression and of his recent worsening severity of depression with the new onset of depression related psychotic features including pt earlier reported AH /VH and apparent evidence of pt paranoid, persecutory, somatic and nihilistic delusional thinking which culminated in the pt's having made a suicide attempt via a self inflicted stab wound to his epigastrium requiring hematoma wound evacuation  at [facility] prior to the pt's transfer to [facility] for acute pt safety and for ongoing pt evaluation and treatment of his severe major depression with psychotic features. The pt reported much improved sleep and appetite and he presents as increasingly  calm and focussed , with attention to his ADLs and to his grooming and with the pt's gradual increased visibility on the unit and participation in therapy groups as tolerated.  This writer reviewed by phone with the pt's parents and later with the pt via psychoeducational information the nature of the pt's presumptive psychotic depression along with basic information related to the pt's current med regimen of Lexapro now at 10 mg po q am and Zyprexa now titrated to 7.5 mg po qhs to alleviate the pt's anxious depression and psychosis  respectively.  The pt appeared to better grasp the nature of his mood and thought disorder and he presented as more hopeful and future oriented once he is clinically stable enough for DC home from hospital with aftercare treatment in place to further cement the pt's clinical gains. The pt continues to deny any current SI /HI/ AH and he presents currently as a low risk for either suicide or for impulsive aggression. 
2/2/25 The patient does not have complaints today. He slept well and he ate well.  He denies thoughts about harming self or somebody else.  He denies hearing voices and seeing things.  Met in the privacy of his room.  Does not need any specific help today.  2/3/25 Pt seen alone and later with both his parents during visiting time on the inpatient unit. The pt 's mother also called and with pt permission, she and this writer spoke of the pt's h/o depression and of his recent worsening severity of depression with the new onset of depression related psychotic features including pt earlier reported AH /VH and apparent evidence of pt paranoid, persecutory, somatic and nihilistic delusional thinking which culminated in the pt's having made a suicide attempt via a self inflicted stab wound to his epigastrium requiring hematoma wound evacuation  at [facility] prior to the pt's transfer to [facility] for acute pt safety and for ongoing pt evaluation and treatment of his severe major depression with psychotic features. The pt reported much improved sleep and appetite and he presents as increasingly  calm and focussed , with attention to his ADLs and to his grooming and with the pt's gradual increased visibility on the unit and participation in therapy groups as tolerated.  This writer reviewed by phone with the pt's parents and later with the pt via psychoeducational information the nature of the pt's presumptive psychotic depression along with basic information related to the pt's current med regimen of Lexapro now at 10 mg po q am and Zyprexa now titrated to 7.5 mg po qhs to alleviate the pt's anxious depression and psychosis  respectively.  The pt appeared to better grasp the nature of his mood and thought disorder and he presented as more hopeful and future oriented once he is clinically stable enough for DC home from hospital with aftercare treatment in place to further cement the pt's clinical gains. The pt continues to deny any current SI /HI/ AH and he presents currently as a low risk for either suicide or for impulsive aggression. 
1/30/25 Pt seen and discussed with treatment team.  The pt, who had reportedly not been sleeping or eating well for some time prior to this first inpatient psychiatric hospital admission, spent most of the day resting/sleeping in his room. The pt, exposed to influenza type A but negative himself for  SARS CO V 2  / RSV / INFLUENZA , was begun on a 10 day course of prophylactic Tamiflu 75 mg po q 24 hours and this may also be adding to the pt's general fatigue. The pt 's self inflicted stab wound to his abdomen prior to his initial admission to [facility] with surgical evacuation of an epigastric hematoma , was then transferred to [address] inpatient psychiatry on 1/29/25 for acute safety and for ongoing evaluation and treatment of a presumptive psychotic depression.   The pt has been tolerating his newly begun med regimen of hs Zyprexa Zydis 5 mg PO q hs and low dose Lexapro 5 mg po q am in an effort to alleviate the pt's severe and functionally  impairing  major depression with psychotic features.  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  The pt has surgical follow up scheduled as an outpatient for suture removal when appropriate.        The pt remains depressed and describes feelings of hopelessness and of having let his family down, but he denies any current active SI/ HI /AH.  The pt's judgment remains impaired with limited insight into the nature and severity of his current psychotic depression. 
2/2/25 The patient does not have complaints today. He slept well and he ate well.  He denies thoughts about harming self or somebody else.  He denies hearing voices and seeing things.  Met in the privacy of his room.  Does not need any specific help today.  2/3/25 Pt seen alone and later with both his parents during visiting time on the inpatient unit. The pt 's mother also called and with pt permission, she and this writer spoke of the pt's h/o depression and of his recent worsening severity of depression with the new onset of depression related psychotic features including pt earlier reported AH /VH and apparent evidence of pt paranoid, persecutory, somatic and nihilistic delusional thinking which culminated in the pt's having made a suicide attempt via a self inflicted stab wound to his epigastrium requiring hematoma wound evacuation  at [facility] prior to the pt's transfer to [facility] for acute pt safety and for ongoing pt evaluation and treatment of his severe major depression with psychotic features. The pt reported much improved sleep and appetite and he presents as increasingly  calm and focussed , with attention to his ADLs and to his grooming and with the pt's gradual increased visibility on the unit and participation in therapy groups as tolerated.  This writer reviewed by phone with the pt's parents and later with the pt via psychoeducational information the nature of the pt's presumptive psychotic depression along with basic information related to the pt's current med regimen of Lexapro now at 10 mg po q am and Zyprexa now titrated to 7.5 mg po qhs to alleviate the pt's anxious depression and psychosis  respectively.  The pt appeared to better grasp the nature of his mood and thought disorder and he presented as more hopeful and future oriented once he is clinically stable enough for DC home from hospital with aftercare treatment in place to further cement the pt's clinical gains. The pt continues to deny any current SI /HI/ AH and he presents currently as a low risk for either suicide or for impulsive aggression. 
02/01/2025: Patient was seen in his room today AM. Writer introduced as his mother worked with the writer at a different [employer]. He was resting in bed, woke up on mentioning his name and added that he feels tired, and denied A/H or paranoid beliefs, still depressed, but overall improving and prefers to stay in room most of the time with limited socialization. No meds changes needed at this time, mood seems depressed with blunted affect. Not S/h and no prior Psych hospitalization. To continue current meds as ordered .     1/30/25 Pt seen and discussed with treatment team.  The pt, who had reportedly not been sleeping or eating well for some time prior to this first inpatient psychiatric hospital admission, spent most of the day resting/sleeping in his room. The pt, exposed to influenza type A but negative himself for  SARS CO V 2  / RSV / INFLUENZA , was begun on a 10 day course of prophylactic Tamiflu 75 mg po q 24 hours and this may also be adding to the pt's general fatigue. The pt 's self inflicted stab wound to his abdomen prior to his initial admission to [facility] with surgical evacuation of an epigastric hematoma , was then transferred to [address] inpatient psychiatry on 1/29/25 for acute safety and for ongoing evaluation and treatment of a presumptive psychotic depression.   The pt has been tolerating his newly begun med regimen of hs Zyprexa Zydis 5 mg PO q hs and low dose Lexapro 5 mg po q am in an effort to alleviate the pt's severe and functionally  impairing  major depression with psychotic features.  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  The pt has surgical follow up scheduled as an outpatient for suture removal when appropriate.        The pt remains depressed and describes feelings of hopelessness and of having let his family down, but he denies any current active SI/ HI /AH.  The pt's judgment remains impaired with limited insight into the nature and severity of his current psychotic depression.

## 2025-02-11 NOTE — BH INPATIENT PSYCHIATRY DISCHARGE NOTE - OTHER
affect becoming gradually stevens in range and intensity  more future oriented and hopeful "  I'm feeling good."

## 2025-02-11 NOTE — BH INPATIENT PSYCHIATRY PROGRESS NOTE - NSBHMSEKNOWHOW_PSY_ALL_CORE
Current Events/Educational attainment/Vocabulary
Educational attainment/Vocabulary
Current Events/Educational attainment/Vocabulary

## 2025-02-11 NOTE — BH TREATMENT PLAN - NSCMSPTSTRENGTHS_PSY_ALL_CORE
Able to adapt/Assertive/Courageous/Expressive of emotions/Future/goal oriented/Intelligence/Interpersonal skills/Leisure interest/Motivated/Physically healthy/Resourceful/Self confidence/Self-reliant/Strong support system/Supportive family
Able to adapt/Assertive/Courageous/Expressive of emotions/Future/goal oriented/Intelligence/Interpersonal skills/Leisure interest/Motivated/Physically healthy/Resourceful/Self confidence/Self-reliant/Strong support system/Supportive family
Able to adapt/Courageous/Expressive of emotions/Future/goal oriented/Intelligence/Leisure interest/Motivated/Physically healthy/Resourceful/Self confidence/Self-reliant/Strong support system/Supportive family

## 2025-02-11 NOTE — BH INPATIENT PSYCHIATRY PROGRESS NOTE - NSTXDEPRESDATEEST_PSY_ALL_CORE
30-Jan-2025

## 2025-02-11 NOTE — BH INPATIENT PSYCHIATRY PROGRESS NOTE - NSTXDCOTHRDATEEST_PSY_ALL_CORE
10-Feb-2025
30-Jan-2025
31-Jan-2025
04-Feb-2025
04-Feb-2025
31-Jan-2025
04-Feb-2025
31-Jan-2025
04-Feb-2025

## 2025-02-12 NOTE — CHART NOTE - NSCHARTNOTEFT_GEN_A_CORE
Per documentation, pt has access to a fire arm. ENRICO met with pt who endorsed having a fire arm in the home, which is locked up. SW discussed case with psychiatrist. Per conversation, psychiatrist completed [employer] for pt. She also indicated she spoke with pt's mother who indicated fire arm removed from the home. SW also spoke with pt's mother, who confirmed the fire arm was removed from the home.
Surgery Service requested for staple removal from nursing staff. Abdominal staples placed 1/25/25.   Patient examined in room with nursing staff. Patient denies pain or discomfort or discharge or fevers, feeling well.   Supraumbilical linear staples and inferior aspect of umbilicus noted. Incisions well approximated, with mild irritation noted at insertion site without evidence of cellulitis or discharge. Staples removed easily and steri-stips placed. Patient tolerated procedure well. Advised not to submerge x 2 weeks and avoid heavy lifting x 1 month.
FOllow up call made, patient stated he was home and felt good. He stated he picked up his medications. Pt was provided with unit number and encouraged to follow discharge plan
[facility] to home yesterday [facility] Pt's family provided transport to ensure safe arrival home. Pt's DC summary was faxed to [facility] for continuity of care.

## 2025-02-20 DIAGNOSIS — F33.3 MAJOR DEPRESSIVE DISORDER, RECURRENT, SEVERE WITH PSYCHOTIC SYMPTOMS: ICD-10-CM

## 2025-02-20 DIAGNOSIS — R45.851 SUICIDAL IDEATIONS: ICD-10-CM
